# Patient Record
Sex: FEMALE | Race: WHITE | Employment: FULL TIME | URBAN - METROPOLITAN AREA
[De-identification: names, ages, dates, MRNs, and addresses within clinical notes are randomized per-mention and may not be internally consistent; named-entity substitution may affect disease eponyms.]

---

## 2018-01-07 ENCOUNTER — APPOINTMENT (OUTPATIENT)
Dept: CT IMAGING | Age: 63
End: 2018-01-07
Attending: EMERGENCY MEDICINE
Payer: SELF-PAY

## 2018-01-07 ENCOUNTER — HOSPITAL ENCOUNTER (EMERGENCY)
Age: 63
Discharge: HOME | End: 2018-01-07
Attending: EMERGENCY MEDICINE
Payer: SELF-PAY

## 2018-01-07 VITALS
HEART RATE: 59 BPM | WEIGHT: 100 LBS | DIASTOLIC BLOOD PRESSURE: 59 MMHG | HEIGHT: 61 IN | SYSTOLIC BLOOD PRESSURE: 91 MMHG | OXYGEN SATURATION: 94 % | BODY MASS INDEX: 18.88 KG/M2 | RESPIRATION RATE: 15 BRPM | TEMPERATURE: 98 F

## 2018-01-07 DIAGNOSIS — R51.9 NONINTRACTABLE EPISODIC HEADACHE, UNSPECIFIED HEADACHE TYPE: Primary | ICD-10-CM

## 2018-01-07 LAB
ALBUMIN SERPL-MCNC: 3.3 G/DL (ref 3.4–5)
ALBUMIN/GLOB SERPL: 0.9 {RATIO} (ref 0.8–1.7)
ALP SERPL-CCNC: 74 U/L (ref 45–117)
ALT SERPL-CCNC: 16 U/L (ref 13–56)
ANION GAP SERPL CALC-SCNC: 7 MMOL/L (ref 3–18)
AST SERPL-CCNC: 16 U/L (ref 15–37)
BASOPHILS # BLD: 0 K/UL (ref 0–0.06)
BASOPHILS NFR BLD: 0 % (ref 0–2)
BILIRUB SERPL-MCNC: 0.3 MG/DL (ref 0.2–1)
BUN SERPL-MCNC: 12 MG/DL (ref 7–18)
BUN/CREAT SERPL: 14 (ref 12–20)
CALCIUM SERPL-MCNC: 8.9 MG/DL (ref 8.5–10.1)
CHLORIDE SERPL-SCNC: 105 MMOL/L (ref 100–108)
CK MB CFR SERPL CALC: NORMAL % (ref 0–4)
CK MB SERPL-MCNC: <1 NG/ML (ref 5–25)
CK SERPL-CCNC: 61 U/L (ref 26–192)
CO2 SERPL-SCNC: 29 MMOL/L (ref 21–32)
CREAT SERPL-MCNC: 0.83 MG/DL (ref 0.6–1.3)
DIFFERENTIAL METHOD BLD: ABNORMAL
EOSINOPHIL # BLD: 0.2 K/UL (ref 0–0.4)
EOSINOPHIL NFR BLD: 2 % (ref 0–5)
ERYTHROCYTE [DISTWIDTH] IN BLOOD BY AUTOMATED COUNT: 14.3 % (ref 11.6–14.5)
ERYTHROCYTE [SEDIMENTATION RATE] IN BLOOD: 14 MM/HR (ref 0–30)
GLOBULIN SER CALC-MCNC: 3.5 G/DL (ref 2–4)
GLUCOSE SERPL-MCNC: 86 MG/DL (ref 74–99)
HCT VFR BLD AUTO: 43.7 % (ref 35–45)
HGB BLD-MCNC: 15.2 G/DL (ref 12–16)
LYMPHOCYTES # BLD: 1.6 K/UL (ref 0.9–3.6)
LYMPHOCYTES NFR BLD: 23 % (ref 21–52)
MCH RBC QN AUTO: 31.1 PG (ref 24–34)
MCHC RBC AUTO-ENTMCNC: 34.8 G/DL (ref 31–37)
MCV RBC AUTO: 89.4 FL (ref 74–97)
MONOCYTES # BLD: 0.7 K/UL (ref 0.05–1.2)
MONOCYTES NFR BLD: 9 % (ref 3–10)
NEUTS SEG # BLD: 4.6 K/UL (ref 1.8–8)
NEUTS SEG NFR BLD: 66 % (ref 40–73)
PLATELET # BLD AUTO: 222 K/UL (ref 135–420)
PMV BLD AUTO: 8.7 FL (ref 9.2–11.8)
POTASSIUM SERPL-SCNC: 4.7 MMOL/L (ref 3.5–5.5)
PROT SERPL-MCNC: 6.8 G/DL (ref 6.4–8.2)
RBC # BLD AUTO: 4.89 M/UL (ref 4.2–5.3)
SODIUM SERPL-SCNC: 141 MMOL/L (ref 136–145)
TROPONIN I SERPL-MCNC: <0.02 NG/ML (ref 0–0.06)
WBC # BLD AUTO: 7.1 K/UL (ref 4.6–13.2)

## 2018-01-07 PROCEDURE — 70450 CT HEAD/BRAIN W/O DYE: CPT

## 2018-01-07 PROCEDURE — 85652 RBC SED RATE AUTOMATED: CPT | Performed by: EMERGENCY MEDICINE

## 2018-01-07 PROCEDURE — 80053 COMPREHEN METABOLIC PANEL: CPT | Performed by: EMERGENCY MEDICINE

## 2018-01-07 PROCEDURE — 96375 TX/PRO/DX INJ NEW DRUG ADDON: CPT

## 2018-01-07 PROCEDURE — 96365 THER/PROPH/DIAG IV INF INIT: CPT

## 2018-01-07 PROCEDURE — 74011000258 HC RX REV CODE- 258: Performed by: EMERGENCY MEDICINE

## 2018-01-07 PROCEDURE — 82550 ASSAY OF CK (CPK): CPT | Performed by: EMERGENCY MEDICINE

## 2018-01-07 PROCEDURE — 85025 COMPLETE CBC W/AUTO DIFF WBC: CPT | Performed by: EMERGENCY MEDICINE

## 2018-01-07 PROCEDURE — 36415 COLL VENOUS BLD VENIPUNCTURE: CPT | Performed by: EMERGENCY MEDICINE

## 2018-01-07 PROCEDURE — 74011250636 HC RX REV CODE- 250/636: Performed by: EMERGENCY MEDICINE

## 2018-01-07 PROCEDURE — 96374 THER/PROPH/DIAG INJ IV PUSH: CPT

## 2018-01-07 PROCEDURE — 99283 EMERGENCY DEPT VISIT LOW MDM: CPT

## 2018-01-07 RX ORDER — DIPHENHYDRAMINE HYDROCHLORIDE 50 MG/ML
25 INJECTION, SOLUTION INTRAMUSCULAR; INTRAVENOUS ONCE
Status: COMPLETED | OUTPATIENT
Start: 2018-01-07 | End: 2018-01-07

## 2018-01-07 RX ORDER — ONDANSETRON 2 MG/ML
4 INJECTION INTRAMUSCULAR; INTRAVENOUS
Status: COMPLETED | OUTPATIENT
Start: 2018-01-07 | End: 2018-01-07

## 2018-01-07 RX ORDER — BUTALBITAL, ACETAMINOPHEN AND CAFFEINE 300; 40; 50 MG/1; MG/1; MG/1
1 CAPSULE ORAL
Qty: 12 CAP | Refills: 0 | Status: SHIPPED | OUTPATIENT
Start: 2018-01-07 | End: 2019-05-16

## 2018-01-07 RX ORDER — KETOROLAC TROMETHAMINE 30 MG/ML
30 INJECTION, SOLUTION INTRAMUSCULAR; INTRAVENOUS
Status: COMPLETED | OUTPATIENT
Start: 2018-01-07 | End: 2018-01-07

## 2018-01-07 RX ADMIN — PROMETHAZINE HYDROCHLORIDE 12.5 MG: 25 INJECTION, SOLUTION INTRAMUSCULAR; INTRAVENOUS at 11:05

## 2018-01-07 RX ADMIN — DIPHENHYDRAMINE HYDROCHLORIDE 25 MG: 50 INJECTION, SOLUTION INTRAMUSCULAR; INTRAVENOUS at 09:04

## 2018-01-07 RX ADMIN — KETOROLAC TROMETHAMINE 30 MG: 30 INJECTION, SOLUTION INTRAMUSCULAR at 09:04

## 2018-01-07 RX ADMIN — ONDANSETRON 4 MG: 2 INJECTION INTRAMUSCULAR; INTRAVENOUS at 09:04

## 2018-01-07 NOTE — ED TRIAGE NOTES
Pt states she has had a migraine headache for 4 days;  Tried aspirin and tylenol without relief;  Makes her nauseated at times also;

## 2018-01-07 NOTE — DISCHARGE INSTRUCTIONS

## 2018-01-07 NOTE — ED PROVIDER NOTES
EMERGENCY DEPARTMENT HISTORY AND PHYSICAL EXAM    Date: 1/7/2018  Patient Name: Tasha Moon    History of Presenting Illness     Chief Complaint   Patient presents with    Headache         History Provided By: Patient    Chief Complaint: HA  Duration: 4 Days  Timing:  Acute  Location: Frontal head  Severity: 10 out of 10  Modifying Factors: HA is exacerbated when coughing. Associated Symptoms: visual disturbance, nausea, vomiting, and phonophobia    Additional History (Context):   8:21 AM  Tasha Moon is a 65 y.o. female with PMHx of arthritis, chronic pain, fibromyalgia, and COPD who presents to the emergency department C/O frontal HA (10/10) radiating to her posterior head onset 4 days ago. Associated sxs include visual disturbance, nausea, vomiting, and phonophobia. Pt denies having similar sxs previously. HA is exacerbated when coughing. Denies HA radiating down her neck. Endorses tobacco use. NKDA. Pt denies sinus pain, tinnitus, Hx diabetes or HTN, and any other sxs or complaints. PCP: Robert Bliss MD        Past History     Past Medical History:  Past Medical History:   Diagnosis Date    Arthritis     Chronic obstructive pulmonary disease ([address]     Chronic pain     Fibromyalgia     Ill-defined condition     raynaulds       Past Surgical History:  Past Surgical History:   Procedure Laterality Date    HX ORTHOPAEDIC      back    NEUROLOGICAL PROCEDURE UNLISTED      brain surgery       Family History:  History reviewed. No pertinent family history. Social History:  Social History   Substance Use Topics    Smoking status: Current Every Day Smoker     Packs/day: 0.50    Smokeless tobacco: None    Alcohol use No       Allergies:  No Known Allergies      Review of Systems   Review of Systems   HENT: Negative for sinus pain and tinnitus.         (+) Phonophobia   Eyes: Positive for visual disturbance. Gastrointestinal: Positive for nausea and vomiting.    Neurological: Positive for headaches. All other systems reviewed and are negative. Physical Exam     Vitals:    01/07/18 0745 01/07/18 1102   BP: 104/66 108/68   Pulse: 76 74   Resp: 16 18   Temp: 98 °F (36.7 °C)    SpO2: 100%    Weight: 45.4 kg (100 lb)    Height: 5' 1\" (1.549 m)      Physical Exam   Constitutional: She is oriented to person, place, and time. She appears well-developed and well-nourished. No distress. Slender female   HENT:   Head: Normocephalic and atraumatic. Eyes: Pupils are equal, round, and reactive to light. Neck: Neck supple. Cardiovascular: Normal rate, regular rhythm, S1 normal, S2 normal and normal heart sounds. Pulmonary/Chest: Breath sounds normal. No respiratory distress. She has no wheezes. She has no rales. She exhibits no tenderness. Abdominal: Soft. She exhibits no distension and no mass. There is no tenderness. There is no guarding. Musculoskeletal: Normal range of motion. She exhibits no edema or tenderness. Neurological: She is alert and oriented to person, place, and time. No cranial nerve deficit. Skin: No rash noted. Psychiatric: She has a normal mood and affect. Her behavior is normal. Thought content normal.   Nursing note and vitals reviewed. Diagnostic Study Results     Labs -     Recent Results (from the past 12 hour(s))   CBC WITH AUTOMATED DIFF    Collection Time: 01/07/18  8:40 AM   Result Value Ref Range    WBC 7.1 4.6 - 13.2 K/uL    RBC 4.89 4.20 - 5.30 M/uL    HGB 15.2 12.0 - 16.0 g/dL    HCT 43.7 35.0 - 45.0 %    MCV 89.4 74.0 - 97.0 FL    MCH 31.1 24.0 - 34.0 PG    MCHC 34.8 31.0 - 37.0 g/dL    RDW 14.3 11.6 - 14.5 %    PLATELET 218 643 - 623 K/uL    MPV 8.7 (L) 9.2 - 11.8 FL    NEUTROPHILS 66 40 - 73 %    LYMPHOCYTES 23 21 - 52 %    MONOCYTES 9 3 - 10 %    EOSINOPHILS 2 0 - 5 %    BASOPHILS 0 0 - 2 %    ABS. NEUTROPHILS 4.6 1.8 - 8.0 K/UL    ABS. LYMPHOCYTES 1.6 0.9 - 3.6 K/UL    ABS. MONOCYTES 0.7 0.05 - 1.2 K/UL    ABS.  EOSINOPHILS 0.2 0.0 - 0.4 K/UL ABS. BASOPHILS 0.0 0.0 - 0.06 K/UL    DF AUTOMATED     SED RATE (ESR)    Collection Time: 01/07/18  8:40 AM   Result Value Ref Range    Sed rate, automated 14 0 - 30 mm/hr   CARDIAC PANEL,(CK, CKMB & TROPONIN)    Collection Time: 01/07/18 10:13 AM   Result Value Ref Range    CK 61 26 - 192 U/L    CK - MB <1.0 <3.6 ng/ml    CK-MB Index  0.0 - 4.0 %     CALCULATION NOT PERFORMED WHEN RESULT IS BELOW LINEAR LIMIT    Troponin-I, Qt. <0.02 0.00 - 8.54 NG/ML   METABOLIC PANEL, COMPREHENSIVE    Collection Time: 01/07/18 10:13 AM   Result Value Ref Range    Sodium 141 136 - 145 mmol/L    Potassium 4.7 3.5 - 5.5 mmol/L    Chloride 105 100 - 108 mmol/L    CO2 29 21 - 32 mmol/L    Anion gap 7 3.0 - 18 mmol/L    Glucose 86 74 - 99 mg/dL    BUN 12 7.0 - 18 MG/DL    Creatinine 0.83 0.6 - 1.3 MG/DL    BUN/Creatinine ratio 14 12 - 20      GFR est AA >60 >60 ml/min/1.73m2    GFR est non-AA >60 >60 ml/min/1.73m2    Calcium 8.9 8.5 - 10.1 MG/DL    Bilirubin, total 0.3 0.2 - 1.0 MG/DL    ALT (SGPT) 16 13 - 56 U/L    AST (SGOT) 16 15 - 37 U/L    Alk. phosphatase 74 45 - 117 U/L    Protein, total 6.8 6.4 - 8.2 g/dL    Albumin 3.3 (L) 3.4 - 5.0 g/dL    Globulin 3.5 2.0 - 4.0 g/dL    A-G Ratio 0.9 0.8 - 1.7         Radiologic Studies -   CT HEAD WO CONT   Final Result   IMPRESSION:     1. No acute intracranial abnormalities are identified. No CT evidence to  suggest acute intracranial hematoma, cortical infarct, or mass effect/mass  lesion. 2. Surgical changes of previous foramen magnum compression, suspect related to  Chiari malformation; borderline low-lying cerebellar tonsils on sagittal images. As read by the radiologist.     CT Results  (Last 48 hours)               01/07/18 0927  CT HEAD WO CONT Final result    Impression:  IMPRESSION:       1. No acute intracranial abnormalities are identified. No CT evidence to   suggest acute intracranial hematoma, cortical infarct, or mass effect/mass   lesion.    2. Surgical changes of previous foramen magnum compression, suspect related to   Chiari malformation; borderline low-lying cerebellar tonsils on sagittal images. Narrative:  EXAM: CT head       INDICATION: Persistent severe migraine for the past 4 days. COMPARISON: None. TECHNIQUE: Axial CT imaging of the head  was obtained from skull base to vertex   without intravenous contrast.       One or more dose reduction techniques were used on this CT: automated exposure   control, adjustment of the mAs and/or kVp according to patient's size, and   iterative reconstruction techniques. The specific techniques utilized on this CT   exam have been documented in the patient's electronic medical record.       _______________       FINDINGS:       BRAIN AND POSTERIOR FOSSA: The sulci, folia, ventricles and basal cisterns are   within normal limits for the patient?s age. Slightly low-lying cerebellar   tonsils. There is no intracranial hemorrhage, mass effect, or shift of midline   structures. There are no areas of abnormal parenchymal attenuation. EXTRA-AXIAL SPACES AND MENINGES: There are no abnormal extra-axial fluid   collections. CALVARIUM: Surgical changes of previous foramen magnum decompression   craniectomy. No acute osseous abnormality. SINUSES: Mild mucosal thickening scattered ethmoid air cells, otherwise   paranasal sinuses and mastoid air cells are well aerated.        OTHER: None.       _______________               CXR Results  (Last 48 hours)    None          Medications given in the ED-  Medications   ondansetron (ZOFRAN) injection 4 mg (4 mg IntraVENous Given 1/7/18 0904)   ketorolac (TORADOL) injection 30 mg (30 mg IntraVENous Given 1/7/18 0904)   diphenhydrAMINE (BENADRYL) injection 25 mg (25 mg IntraVENous Given 1/7/18 0904)   promethazine (PHENERGAN) 12.5 mg in 0.9% sodium chloride 50 mL IVPB (12.5 mg IntraVENous New Bag 1/7/18 1105)         Medical Decision Making   I am the first provider for this patient. I reviewed the vital signs, available nursing notes, past medical history, past surgical history, family history and social history. Vital Signs-Reviewed the patient's vital signs. Pulse Oximetry Analysis - 100% on RA. Records Reviewed: Nursing Notes    Provider Notes (Medical Decision Making):   INITIAL CLINICAL IMPRESSION and PLANS:  The patient presents with the primary complaint(s) of: HA. The presentation, to include historical aspects and clinical findings are consistent with the DX of HA. However, other possible DX's to consider as primary, associated with, or exacerbated by include:    1. Migraine HA  2. Intracranial bleed  3. Tumor  4. Sinusitis  5. Cluster HA  6. Tension HA  7. CVA    Considering the above, my initial management plan to evaluate and therapeutic interventions include the following and as noted in the orders:    1. Labs: Cardiac panel, SED Rate (ESR), CMP, CBC  2. Imaging: CT Head w/o contrast    Recorded by Em Thompson ED Scribe, as dictated by Rg Fowler MD.    Procedures:  Procedures    ED Course:   8:21 AM Initial assessment performed. The patients presenting problems have been discussed, and they are in agreement with the care plan formulated and outlined with them. I have encouraged them to ask questions as they arise throughout their visit. PROGRESS NOTE:   11:01 AM  Pt has been re-examined by Rg Fowler MD. Pt states she feels much better. 20 minutes ago she stated the HA was not improved. Discussed spinal tap and CTA. She did not want the spinal tap. She now states her HA is a 3/10. Will hold off on the CTA and spinal tap. Diagnosis and Disposition       DISCHARGE NOTE:  11:43 AM  Александр Marzenaalexsander Alejandra's  results have been reviewed with her. She has been counseled regarding her diagnosis, treatment, and plan.   She verbally conveys understanding and agreement of the signs, symptoms, diagnosis, treatment and prognosis and additionally agrees to follow up as discussed. She also agrees with the care-plan and conveys that all of her questions have been answered. I have also provided discharge instructions for her that include: educational information regarding their diagnosis and treatment, and list of reasons why they would want to return to the ED prior to their follow-up appointment, should her condition change. She has been provided with education for proper emergency department utilization. CLINICAL IMPRESSION:    1. Nonintractable episodic headache, unspecified headache type        PLAN:  1. D/C Home  2. Current Discharge Medication List      START taking these medications    Details   butalbital-acetaminophen-caff (FIORICET) -40 mg per capsule Take 1 Cap by mouth every four (4) hours as needed for Pain. Qty: 12 Cap, Refills: 0           3. Follow-up Information     Follow up With Details Comments Contact Info    Tonye Buerger, MD Schedule an appointment as soon as possible for a visit in 3 days For primary care follow up. [address]  [phone]      [facility] EMERGENCY DEPT Go to As needed, If symptoms worsen [address] [id number]  [phone]        _______________________________    Attestations: This note is prepared by Ellie Meza, acting as Scribe for Wilian Ruiz MD.    Wilian Ruiz MD:  The scribe's documentation has been prepared under my direction and personally reviewed by me in its entirety.   I confirm that the note above accurately reflects all work, treatment, procedures, and medical decision making performed by me.  _______________________________

## 2018-01-07 NOTE — ED NOTES
Assumed care of patient at this time. Pt lying in stretcher, eyes closed in NAD, pt medicated per MAR orders, verified w/ patient that her daughter is OTW to pick her up from ER prior to administration of medication. Pt repositioned in stretcher for comfort. Bed in lowest locked position, call bell within reach (left side rail), side rail raised x 1 (left). Pt expresses no further needs at this time.

## 2018-01-08 ENCOUNTER — HOSPITAL ENCOUNTER (EMERGENCY)
Age: 63
Discharge: HOME | End: 2018-01-08
Attending: EMERGENCY MEDICINE | Admitting: EMERGENCY MEDICINE
Payer: SELF-PAY

## 2018-01-08 VITALS
SYSTOLIC BLOOD PRESSURE: 94 MMHG | HEIGHT: 61 IN | OXYGEN SATURATION: 96 % | BODY MASS INDEX: 18.88 KG/M2 | DIASTOLIC BLOOD PRESSURE: 55 MMHG | TEMPERATURE: 98.1 F | WEIGHT: 100 LBS | RESPIRATION RATE: 18 BRPM | HEART RATE: 60 BPM

## 2018-01-08 DIAGNOSIS — R51.9 NONINTRACTABLE HEADACHE, UNSPECIFIED CHRONICITY PATTERN, UNSPECIFIED HEADACHE TYPE: Primary | ICD-10-CM

## 2018-01-08 LAB
ALBUMIN SERPL-MCNC: 3.4 G/DL (ref 3.4–5)
ALBUMIN/GLOB SERPL: 0.9 {RATIO} (ref 0.8–1.7)
ALP SERPL-CCNC: 79 U/L (ref 45–117)
ALT SERPL-CCNC: 19 U/L (ref 13–56)
ANION GAP SERPL CALC-SCNC: 12 MMOL/L (ref 3–18)
APPEARANCE UR: CLEAR
AST SERPL-CCNC: 18 U/L (ref 15–37)
BACTERIA URNS QL MICRO: ABNORMAL /HPF
BASOPHILS # BLD: 0 K/UL (ref 0–0.06)
BASOPHILS NFR BLD: 1 % (ref 0–2)
BILIRUB SERPL-MCNC: 0.3 MG/DL (ref 0.2–1)
BILIRUB UR QL: NEGATIVE
BUN SERPL-MCNC: 13 MG/DL (ref 7–18)
BUN/CREAT SERPL: 18 (ref 12–20)
CALCIUM SERPL-MCNC: 8.8 MG/DL (ref 8.5–10.1)
CHLORIDE SERPL-SCNC: 104 MMOL/L (ref 100–108)
CO2 SERPL-SCNC: 25 MMOL/L (ref 21–32)
COLOR UR: YELLOW
CREAT SERPL-MCNC: 0.73 MG/DL (ref 0.6–1.3)
DIFFERENTIAL METHOD BLD: ABNORMAL
EOSINOPHIL # BLD: 0 K/UL (ref 0–0.4)
EOSINOPHIL NFR BLD: 0 % (ref 0–5)
EPITH CASTS URNS QL MICRO: ABNORMAL /LPF (ref 0–5)
ERYTHROCYTE [DISTWIDTH] IN BLOOD BY AUTOMATED COUNT: 14 % (ref 11.6–14.5)
GLOBULIN SER CALC-MCNC: 3.6 G/DL (ref 2–4)
GLUCOSE SERPL-MCNC: 127 MG/DL (ref 74–99)
GLUCOSE UR STRIP.AUTO-MCNC: NEGATIVE MG/DL
HCT VFR BLD AUTO: 39.8 % (ref 35–45)
HGB BLD-MCNC: 13.7 G/DL (ref 12–16)
HGB UR QL STRIP: ABNORMAL
KETONES UR QL STRIP.AUTO: NEGATIVE MG/DL
LEUKOCYTE ESTERASE UR QL STRIP.AUTO: NEGATIVE
LYMPHOCYTES # BLD: 1.3 K/UL (ref 0.9–3.6)
LYMPHOCYTES NFR BLD: 20 % (ref 21–52)
MCH RBC QN AUTO: 31 PG (ref 24–34)
MCHC RBC AUTO-ENTMCNC: 34.4 G/DL (ref 31–37)
MCV RBC AUTO: 90 FL (ref 74–97)
MONOCYTES # BLD: 0.6 K/UL (ref 0.05–1.2)
MONOCYTES NFR BLD: 10 % (ref 3–10)
MUCOUS THREADS URNS QL MICRO: ABNORMAL /LPF
NEUTS SEG # BLD: 4.6 K/UL (ref 1.8–8)
NEUTS SEG NFR BLD: 69 % (ref 40–73)
NITRITE UR QL STRIP.AUTO: NEGATIVE
PH UR STRIP: 5 [PH] (ref 5–8)
PLATELET # BLD AUTO: 221 K/UL (ref 135–420)
PMV BLD AUTO: 8.6 FL (ref 9.2–11.8)
POTASSIUM SERPL-SCNC: 4.1 MMOL/L (ref 3.5–5.5)
PROT SERPL-MCNC: 7 G/DL (ref 6.4–8.2)
PROT UR STRIP-MCNC: NEGATIVE MG/DL
RBC # BLD AUTO: 4.42 M/UL (ref 4.2–5.3)
RBC #/AREA URNS HPF: ABNORMAL /HPF (ref 0–5)
SODIUM SERPL-SCNC: 141 MMOL/L (ref 136–145)
SP GR UR REFRACTOMETRY: 1.02 (ref 1–1.03)
UROBILINOGEN UR QL STRIP.AUTO: 0.2 EU/DL (ref 0.2–1)
WBC # BLD AUTO: 6.6 K/UL (ref 4.6–13.2)
WBC URNS QL MICRO: ABNORMAL /HPF (ref 0–5)

## 2018-01-08 PROCEDURE — 96374 THER/PROPH/DIAG INJ IV PUSH: CPT

## 2018-01-08 PROCEDURE — 96375 TX/PRO/DX INJ NEW DRUG ADDON: CPT

## 2018-01-08 PROCEDURE — 99283 EMERGENCY DEPT VISIT LOW MDM: CPT

## 2018-01-08 PROCEDURE — 81001 URINALYSIS AUTO W/SCOPE: CPT | Performed by: PHYSICIAN ASSISTANT

## 2018-01-08 PROCEDURE — 96361 HYDRATE IV INFUSION ADD-ON: CPT

## 2018-01-08 PROCEDURE — 74011250636 HC RX REV CODE- 250/636: Performed by: PHYSICIAN ASSISTANT

## 2018-01-08 PROCEDURE — 85025 COMPLETE CBC W/AUTO DIFF WBC: CPT | Performed by: PHYSICIAN ASSISTANT

## 2018-01-08 PROCEDURE — 80053 COMPREHEN METABOLIC PANEL: CPT | Performed by: PHYSICIAN ASSISTANT

## 2018-01-08 RX ORDER — SODIUM CHLORIDE 0.9 % (FLUSH) 0.9 %
5-10 SYRINGE (ML) INJECTION AS NEEDED
Status: DISCONTINUED | OUTPATIENT
Start: 2018-01-08 | End: 2018-01-08 | Stop reason: HOSPADM

## 2018-01-08 RX ORDER — MORPHINE SULFATE 2 MG/ML
4 INJECTION, SOLUTION INTRAMUSCULAR; INTRAVENOUS ONCE
Status: COMPLETED | OUTPATIENT
Start: 2018-01-08 | End: 2018-01-08

## 2018-01-08 RX ORDER — METHOCARBAMOL 750 MG/1
750 TABLET, FILM COATED ORAL 3 TIMES DAILY
Qty: 20 TAB | Refills: 0 | Status: SHIPPED | OUTPATIENT
Start: 2018-01-08 | End: 2019-05-16

## 2018-01-08 RX ORDER — ONDANSETRON 2 MG/ML
4 INJECTION INTRAMUSCULAR; INTRAVENOUS ONCE
Status: COMPLETED | OUTPATIENT
Start: 2018-01-08 | End: 2018-01-08

## 2018-01-08 RX ORDER — PREDNISONE 10 MG/1
TABLET ORAL
Qty: 21 TAB | Refills: 0 | Status: SHIPPED | OUTPATIENT
Start: 2018-01-08 | End: 2019-05-16

## 2018-01-08 RX ORDER — DEXAMETHASONE SODIUM PHOSPHATE 10 MG/ML
10 INJECTION INTRAMUSCULAR; INTRAVENOUS
Status: COMPLETED | OUTPATIENT
Start: 2018-01-08 | End: 2018-01-08

## 2018-01-08 RX ADMIN — ONDANSETRON 4 MG: 2 INJECTION INTRAMUSCULAR; INTRAVENOUS at 10:43

## 2018-01-08 RX ADMIN — DEXAMETHASONE SODIUM PHOSPHATE 10 MG: 10 INJECTION, SOLUTION INTRAMUSCULAR; INTRAVENOUS at 10:44

## 2018-01-08 RX ADMIN — SODIUM CHLORIDE 1000 ML: 900 INJECTION, SOLUTION INTRAVENOUS at 10:41

## 2018-01-08 RX ADMIN — MORPHINE SULFATE 4 MG: 2 INJECTION, SOLUTION INTRAMUSCULAR; INTRAVENOUS at 10:45

## 2018-01-08 NOTE — ED TRIAGE NOTES
Pt states seen here yest for same, not any better, continues to have headache. Pt states had CT scan done yest. Negative results, pt states hx of migraines pt states this headache is different. Sepsis Screening completed    (  )Patient meets SIRS criteria. (x  )Patient does not meet SIRS criteria.       SIRS Criteria is achieved when two or more of the following are present   Temperature < 96.8°F (36°C) or > 100.9°F (38.3°C)   Heart Rate > 90 beats per minute   Respiratory Rate > 20 breaths per minute   WBC count > 12,000 or <4,000 or > 10% bands

## 2018-01-08 NOTE — ED PROVIDER NOTES
EMERGENCY DEPARTMENT HISTORY AND PHYSICAL EXAM    Date: 1/8/2018  Patient Name: Radha Hubbard    History of Presenting Illness     Chief Complaint   Patient presents with    Headache         History Provided By: Patient    Chief Complaint: headache  Duration: 5 Days  Timing:  Gradual  Location: frontal, radiating to right side of her head  Quality: throbbing  Severity: 10 out of 10  Associated Symptoms: dizziness, blurry vision, confusion    Additional History (Context):   10:05 AM    Radha Hubbard is a 65 y.o. female with pertinent PMHx of chiari malformation repair surgery, chronic pain, fibromyalgia and migraines presenting ambulatory to the ED c/o 10/10 throbbing headache x 5 days. Pt states that the headache started frontally, which began radiating to the right side of her head yesterday. Pt notes associated sxs of blurry vision, dizziness, and confusion. Pt denies any hx of similar sxs. Pt states that she was taken Tylenol and Darius ASA, with no relief. Pt was seen in the ED yesterday and was given Fioricet, which did not seem to help today. Pt had a nml CT yesterday. Pt notes that she had a right upper tooth pulled recently. Pt specifically denies any urinary sxs, cough, congestion, abd pain, diarrhea, fever/chills, CP, new neck stiffness, or new SOB. PCP: Mirtha Swann MD  Social Hx: + tobacco use, - alcohol use, - illicit drug use    There are no other complaints, changes, or physical findings at this time. Current Facility-Administered Medications   Medication Dose Route Frequency Provider Last Rate Last Dose    sodium chloride (NS) flush 5-10 mL  5-10 mL IntraVENous PRN CARISSA Banda         Current Outpatient Prescriptions   Medication Sig Dispense Refill    predniSONE (STERAPRED DS) 10 mg dose pack 6,5,4,3,2,1 taper 21 Tab 0    methocarbamol (ROBAXIN-750) 750 mg tablet Take 1 Tab by mouth three (3) times daily.  20 Tab 0    butalbital-acetaminophen-caff (FIORICET) -40 mg per capsule Take 1 Cap by mouth every four (4) hours as needed for Pain. 12 Cap 0       Past History     Past Medical History:  Past Medical History:   Diagnosis Date    Arthritis     Chronic obstructive pulmonary disease (HCC)     Chronic pain     Fibromyalgia     Ill-defined condition     geneva       Past Surgical History:  Past Surgical History:   Procedure Laterality Date    HX ORTHOPAEDIC      back    NEUROLOGICAL PROCEDURE UNLISTED      brain surgery       Family History:  History reviewed. No pertinent family history. Social History:  Social History   Substance Use Topics    Smoking status: Current Every Day Smoker     Packs/day: 0.50    Smokeless tobacco: Never Used    Alcohol use No       Allergies:  No Known Allergies      Review of Systems   Review of Systems   Constitutional: Negative for chills and fever. HENT: Negative for congestion. Eyes: Positive for visual disturbance (blurry vision). Respiratory: Negative for cough and shortness of breath. Cardiovascular: Negative for chest pain. Gastrointestinal: Negative for abdominal pain and diarrhea. Genitourinary: Negative. Negative for dysuria and frequency. Musculoskeletal: Negative for neck pain and neck stiffness. Neurological: Positive for dizziness and headaches. Psychiatric/Behavioral: Positive for confusion. All other systems reviewed and are negative. Physical Exam     Vitals:    01/08/18 1001   BP: 94/55   Pulse: 60   Resp: 18   Temp: 98.1 °F (36.7 °C)   SpO2: 96%   Weight: 45.4 kg (100 lb)   Height: 5' 1\" (1.549 m)     Physical Exam   Constitutional: She is oriented to person, place, and time. She appears well-developed and well-nourished. No distress. HENT:   Head: Normocephalic and atraumatic.    Right Ear: Tympanic membrane, external ear and ear canal normal.   Left Ear: Tympanic membrane, external ear and ear canal normal.   Nose: Nose normal.   Mouth/Throat: Uvula is midline, oropharynx is clear and moist and mucous membranes are normal. No oropharyngeal exudate, posterior oropharyngeal edema or posterior oropharyngeal erythema. Eyes: EOM are normal. Pupils are equal, round, and reactive to light. Right eye exhibits no discharge. Left eye exhibits no discharge. Neck: Trachea normal, normal range of motion and full passive range of motion without pain. Neck supple. No rigidity. Cardiovascular: Normal rate, regular rhythm, normal heart sounds and normal pulses. Exam reveals no gallop and no friction rub. No murmur heard. Pulmonary/Chest: Effort normal and breath sounds normal. No respiratory distress. She has no wheezes. She has no rales. She exhibits no tenderness. Abdominal: Soft. Bowel sounds are normal. She exhibits no distension and no mass. There is no tenderness. There is no rebound and no guarding. Musculoskeletal: Normal range of motion. Lymphadenopathy:     She has no cervical adenopathy. Neurological: She is alert and oriented to person, place, and time. She has normal reflexes. She displays normal reflexes. No cranial nerve deficit or sensory deficit. She exhibits normal muscle tone. She displays a negative Romberg sign. Coordination normal.   Skin: Skin is warm and dry. No rash noted. She is not diaphoretic. Psychiatric: She has a normal mood and affect. Judgment normal.   Nursing note and vitals reviewed. DDX: probable tension HA. No evidence of dental abscess after recent extraction. Had normal CT yesterday.   Will check basic labs, Hydrate and treat pain    Diagnostic Study Results     Labs -     Recent Results (from the past 12 hour(s))   CBC WITH AUTOMATED DIFF    Collection Time: 01/08/18 10:27 AM   Result Value Ref Range    WBC 6.6 4.6 - 13.2 K/uL    RBC 4.42 4.20 - 5.30 M/uL    HGB 13.7 12.0 - 16.0 g/dL    HCT 39.8 35.0 - 45.0 %    MCV 90.0 74.0 - 97.0 FL    MCH 31.0 24.0 - 34.0 PG    MCHC 34.4 31.0 - 37.0 g/dL    RDW 14.0 11.6 - 14.5 %    PLATELET 547 554 - 420 K/uL    MPV 8.6 (L) 9.2 - 11.8 FL    NEUTROPHILS 69 40 - 73 %    LYMPHOCYTES 20 (L) 21 - 52 %    MONOCYTES 10 3 - 10 %    EOSINOPHILS 0 0 - 5 %    BASOPHILS 1 0 - 2 %    ABS. NEUTROPHILS 4.6 1.8 - 8.0 K/UL    ABS. LYMPHOCYTES 1.3 0.9 - 3.6 K/UL    ABS. MONOCYTES 0.6 0.05 - 1.2 K/UL    ABS. EOSINOPHILS 0.0 0.0 - 0.4 K/UL    ABS. BASOPHILS 0.0 0.0 - 0.06 K/UL    DF AUTOMATED     METABOLIC PANEL, COMPREHENSIVE    Collection Time: 01/08/18 10:27 AM   Result Value Ref Range    Sodium 141 136 - 145 mmol/L    Potassium 4.1 3.5 - 5.5 mmol/L    Chloride 104 100 - 108 mmol/L    CO2 25 21 - 32 mmol/L    Anion gap 12 3.0 - 18 mmol/L    Glucose 127 (H) 74 - 99 mg/dL    BUN 13 7.0 - 18 MG/DL    Creatinine 0.73 0.6 - 1.3 MG/DL    BUN/Creatinine ratio 18 12 - 20      GFR est AA >60 >60 ml/min/1.73m2    GFR est non-AA >60 >60 ml/min/1.73m2    Calcium 8.8 8.5 - 10.1 MG/DL    Bilirubin, total 0.3 0.2 - 1.0 MG/DL    ALT (SGPT) 19 13 - 56 U/L    AST (SGOT) 18 15 - 37 U/L    Alk.  phosphatase 79 45 - 117 U/L    Protein, total 7.0 6.4 - 8.2 g/dL    Albumin 3.4 3.4 - 5.0 g/dL    Globulin 3.6 2.0 - 4.0 g/dL    A-G Ratio 0.9 0.8 - 1.7     URINALYSIS W/ RFLX MICROSCOPIC    Collection Time: 01/08/18 11:40 AM   Result Value Ref Range    Color YELLOW      Appearance CLEAR      Specific gravity 1.022 1.005 - 1.030      pH (UA) 5.0 5.0 - 8.0      Protein NEGATIVE  NEG mg/dL    Glucose NEGATIVE  NEG mg/dL    Ketone NEGATIVE  NEG mg/dL    Bilirubin NEGATIVE  NEG      Blood TRACE (A) NEG      Urobilinogen 0.2 0.2 - 1.0 EU/dL    Nitrites NEGATIVE  NEG      Leukocyte Esterase NEGATIVE  NEG     URINE MICROSCOPIC ONLY    Collection Time: 01/08/18 11:40 AM   Result Value Ref Range    WBC 0 to 1 0 - 5 /hpf    RBC 0 to 3 0 - 5 /hpf    Epithelial cells 2+ 0 - 5 /lpf    Bacteria FEW (A) NEG /hpf    Mucus FEW (A) NEG /lpf       Radiologic Studies -   No orders to display     CT Results  (Last 48 hours)               01/07/18 0927  CT HEAD WO CONT Final result    Impression:  IMPRESSION:       1. No acute intracranial abnormalities are identified. No CT evidence to   suggest acute intracranial hematoma, cortical infarct, or mass effect/mass   lesion. 2. Surgical changes of previous foramen magnum compression, suspect related to   Chiari malformation; borderline low-lying cerebellar tonsils on sagittal images. Narrative:  EXAM: CT head       INDICATION: Persistent severe migraine for the past 4 days. COMPARISON: None. TECHNIQUE: Axial CT imaging of the head  was obtained from skull base to vertex   without intravenous contrast.       One or more dose reduction techniques were used on this CT: automated exposure   control, adjustment of the mAs and/or kVp according to patient's size, and   iterative reconstruction techniques. The specific techniques utilized on this CT   exam have been documented in the patient's electronic medical record.       _______________       FINDINGS:       BRAIN AND POSTERIOR FOSSA: The sulci, folia, ventricles and basal cisterns are   within normal limits for the patient?s age. Slightly low-lying cerebellar   tonsils. There is no intracranial hemorrhage, mass effect, or shift of midline   structures. There are no areas of abnormal parenchymal attenuation. EXTRA-AXIAL SPACES AND MENINGES: There are no abnormal extra-axial fluid   collections. CALVARIUM: Surgical changes of previous foramen magnum decompression   craniectomy. No acute osseous abnormality. SINUSES: Mild mucosal thickening scattered ethmoid air cells, otherwise   paranasal sinuses and mastoid air cells are well aerated. OTHER: None.       _______________                   Medical Decision Making   I am the first provider for this patient. I reviewed the vital signs, available nursing notes, past medical history, past surgical history, family history and social history.     Vital Signs-Reviewed the patient's vital signs.    Pulse Oximetry Analysis - 96% on RA     Records Reviewed: Nursing Notes, Old Medical Records and Previous Radiology Studies    Provider Notes (Medical Decision Making): PROCEDURES:  Procedures    MEDICATIONS GIVEN IN THE ED:  Medications   sodium chloride (NS) flush 5-10 mL (not administered)   sodium chloride 0.9 % bolus infusion 1,000 mL (1,000 mL IntraVENous New Bag 1/8/18 1041)   ondansetron (ZOFRAN) injection 4 mg (4 mg IntraVENous Given 1/8/18 1043)   morphine injection 4 mg (4 mg IntraVENous Given 1/8/18 1045)   dexamethasone (PF) (DECADRON) injection 10 mg (10 mg IntraVENous Given 1/8/18 1044)        ED COURSE:   10:05 AM   Initial assessment performed. PROGRESS NOTE:  12:32 PM  Discussed pt's hx and available diagnostic and imaging results with Brook Westfall MD, the pt's attending. Reviewed care plans and Brook Westfall MD is in agreement with the plan of care and DC. Written by GASTON Riojasibpipo, as dictated by Sonia Rae PA-C. PROGRESS NOTE:  12:37 PM  Pt and/or family have been updated on their results. Pt and/or pt's family are aware of the plan of care and are in agreement. Written by GASTON Riojas, as dictated by Sonia Rae PA-C. Diagnosis and Disposition       DISCHARGE NOTE:  12:38 PM  The patient is ready for discharge. The patient's signs, symptoms, diagnosis, and discharge instructions have been discussed and the patient and/or family has conveyed their understanding. The patient and/or family is to follow up as recommended or return to the ER should their symptoms worsen. Plan has been discussed and the patient and/or family is in agreement. Written by GASTON Riojas, as dictated by Sonia Rae PA-C.     CLINICAL IMPRESSION:  1. Nonintractable headache, unspecified chronicity pattern, unspecified headache type        PLAN:  1. D/C Home  2.    Current Discharge Medication List      START taking these medications Details   predniSONE (STERAPRED DS) 10 mg dose pack 6,5,4,3,2,1 taper  Qty: 21 Tab, Refills: 0      methocarbamol (ROBAXIN-750) 750 mg tablet Take 1 Tab by mouth three (3) times daily. Qty: 20 Tab, Refills: 0           3. Follow-up Information     Follow up With Details Comments [address], [place] Schedule an appointment as soon as possible for a visit for primary care follow up, as needed [address] Tang Portillo MD Schedule an appointment as soon as possible for a visit for neurology follow up, as needed [address] [facility] [address]  [phone]      [facility] EMERGENCY DEPT  As needed, If symptoms worsen [address] [address]  [phone]        _______________________________    Attestations: This note is prepared by Otto De Dios, acting as Scribe for [employer]. [employer]:  The scribe's documentation has been prepared under my direction and personally reviewed by me in its entirety.   I confirm that the note above accurately reflects all work, treatment, procedures, and medical decision making performed by me.  _______________________________

## 2018-01-10 ENCOUNTER — HOSPITAL ENCOUNTER (EMERGENCY)
Age: 63
Discharge: HOME | End: 2018-01-10
Attending: EMERGENCY MEDICINE
Payer: SELF-PAY

## 2018-01-10 VITALS
DIASTOLIC BLOOD PRESSURE: 68 MMHG | RESPIRATION RATE: 16 BRPM | TEMPERATURE: 97.5 F | HEART RATE: 64 BPM | BODY MASS INDEX: 18.88 KG/M2 | SYSTOLIC BLOOD PRESSURE: 102 MMHG | WEIGHT: 100 LBS | HEIGHT: 61 IN | OXYGEN SATURATION: 99 %

## 2018-01-10 DIAGNOSIS — R51.9 NONINTRACTABLE HEADACHE, UNSPECIFIED CHRONICITY PATTERN, UNSPECIFIED HEADACHE TYPE: Primary | ICD-10-CM

## 2018-01-10 LAB
ANION GAP SERPL CALC-SCNC: 11 MMOL/L (ref 3–18)
BASOPHILS # BLD: 0 K/UL (ref 0–0.06)
BASOPHILS NFR BLD: 1 % (ref 0–2)
BUN SERPL-MCNC: 11 MG/DL (ref 7–18)
BUN/CREAT SERPL: 13 (ref 12–20)
CALCIUM SERPL-MCNC: 9.1 MG/DL (ref 8.5–10.1)
CHLORIDE SERPL-SCNC: 105 MMOL/L (ref 100–108)
CO2 SERPL-SCNC: 28 MMOL/L (ref 21–32)
CREAT SERPL-MCNC: 0.85 MG/DL (ref 0.6–1.3)
DIFFERENTIAL METHOD BLD: ABNORMAL
EOSINOPHIL # BLD: 0 K/UL (ref 0–0.4)
EOSINOPHIL NFR BLD: 0 % (ref 0–5)
ERYTHROCYTE [DISTWIDTH] IN BLOOD BY AUTOMATED COUNT: 14 % (ref 11.6–14.5)
GLUCOSE SERPL-MCNC: 109 MG/DL (ref 74–99)
HCT VFR BLD AUTO: 43.1 % (ref 35–45)
HGB BLD-MCNC: 14.6 G/DL (ref 12–16)
LYMPHOCYTES # BLD: 1.6 K/UL (ref 0.9–3.6)
LYMPHOCYTES NFR BLD: 27 % (ref 21–52)
MCH RBC QN AUTO: 30.5 PG (ref 24–34)
MCHC RBC AUTO-ENTMCNC: 33.9 G/DL (ref 31–37)
MCV RBC AUTO: 90 FL (ref 74–97)
MONOCYTES # BLD: 0.8 K/UL (ref 0.05–1.2)
MONOCYTES NFR BLD: 14 % (ref 3–10)
NEUTS SEG # BLD: 3.5 K/UL (ref 1.8–8)
NEUTS SEG NFR BLD: 58 % (ref 40–73)
PLATELET # BLD AUTO: 220 K/UL (ref 135–420)
PMV BLD AUTO: 8.6 FL (ref 9.2–11.8)
POTASSIUM SERPL-SCNC: 3.4 MMOL/L (ref 3.5–5.5)
RBC # BLD AUTO: 4.79 M/UL (ref 4.2–5.3)
SODIUM SERPL-SCNC: 144 MMOL/L (ref 136–145)
WBC # BLD AUTO: 6 K/UL (ref 4.6–13.2)

## 2018-01-10 PROCEDURE — 96361 HYDRATE IV INFUSION ADD-ON: CPT

## 2018-01-10 PROCEDURE — 74011250636 HC RX REV CODE- 250/636: Performed by: PHYSICIAN ASSISTANT

## 2018-01-10 PROCEDURE — 96375 TX/PRO/DX INJ NEW DRUG ADDON: CPT

## 2018-01-10 PROCEDURE — 99281 EMR DPT VST MAYX REQ PHY/QHP: CPT

## 2018-01-10 PROCEDURE — 80048 BASIC METABOLIC PNL TOTAL CA: CPT | Performed by: PHYSICIAN ASSISTANT

## 2018-01-10 PROCEDURE — 85025 COMPLETE CBC W/AUTO DIFF WBC: CPT | Performed by: PHYSICIAN ASSISTANT

## 2018-01-10 PROCEDURE — 96374 THER/PROPH/DIAG INJ IV PUSH: CPT

## 2018-01-10 RX ORDER — METHADONE HYDROCHLORIDE 10 MG/1
145 TABLET ORAL DAILY
COMMUNITY
End: 2018-01-10

## 2018-01-10 RX ORDER — METHADONE HYDROCHLORIDE 10 MG/ML
145 CONCENTRATE ORAL DAILY
COMMUNITY

## 2018-01-10 RX ORDER — KETOROLAC TROMETHAMINE 30 MG/ML
30 INJECTION, SOLUTION INTRAMUSCULAR; INTRAVENOUS
Status: COMPLETED | OUTPATIENT
Start: 2018-01-10 | End: 2018-01-10

## 2018-01-10 RX ORDER — PROCHLORPERAZINE EDISYLATE 5 MG/ML
5 INJECTION INTRAMUSCULAR; INTRAVENOUS
Status: COMPLETED | OUTPATIENT
Start: 2018-01-10 | End: 2018-01-10

## 2018-01-10 RX ORDER — DEXAMETHASONE SODIUM PHOSPHATE 4 MG/ML
6 INJECTION, SOLUTION INTRA-ARTICULAR; INTRALESIONAL; INTRAMUSCULAR; INTRAVENOUS; SOFT TISSUE ONCE
Status: COMPLETED | OUTPATIENT
Start: 2018-01-10 | End: 2018-01-10

## 2018-01-10 RX ORDER — DIPHENHYDRAMINE HYDROCHLORIDE 50 MG/ML
25 INJECTION, SOLUTION INTRAMUSCULAR; INTRAVENOUS ONCE
Status: COMPLETED | OUTPATIENT
Start: 2018-01-10 | End: 2018-01-10

## 2018-01-10 RX ADMIN — DEXAMETHASONE SODIUM PHOSPHATE 6 MG: 4 INJECTION, SOLUTION INTRAMUSCULAR; INTRAVENOUS at 09:53

## 2018-01-10 RX ADMIN — KETOROLAC TROMETHAMINE 30 MG: 30 INJECTION, SOLUTION INTRAMUSCULAR at 08:48

## 2018-01-10 RX ADMIN — SODIUM CHLORIDE 1000 ML: 900 INJECTION, SOLUTION INTRAVENOUS at 08:48

## 2018-01-10 RX ADMIN — PROCHLORPERAZINE EDISYLATE 5 MG: 5 INJECTION INTRAMUSCULAR; INTRAVENOUS at 08:48

## 2018-01-10 RX ADMIN — DIPHENHYDRAMINE HYDROCHLORIDE 25 MG: 50 INJECTION, SOLUTION INTRAMUSCULAR; INTRAVENOUS at 08:50

## 2018-01-10 NOTE — ED PROVIDER NOTES
EMERGENCY DEPARTMENT HISTORY AND PHYSICAL EXAM    Date: 1/10/2018  Patient Name: Izabella Perez    History of Presenting Illness     Chief Complaint   Patient presents with    Headache         History Provided By: Patient    Chief Complaint: HA  Duration: 5 Days  Timing:  Gradual and Worsening  Location: Frontal and temple head  Severity: 10 out of 10  Modifying Factors: No alleviation with Fioricet. Short term alleviation with Morphine and Toradol. Associated Symptoms: photophobia, nausea, and 2 episodes of vomiting    Additional History (Context):   8:13 AM  Izabella Perez is a 65 y.o. female with PMHx of arthritis, chronic pain, fibromyalgia, and COPD who presents to the emergency department C/O gradually worsening frontal and temple HA (10/10) radiating to he posterior head onset 5 days ago. Associated sxs include photophobia, nausea, and 2 episodes of vomiting. No alleviation with Fioricet. Short term alleviation (~1 hour) with Morphine and Toradol. States she has not been able to take the Prednisone due to the vomiting. Reports Hx of migraines but states this one feels different than her typical migraines. Denies having a neurologist. Pt reports this is her third visit for the same HA. Pt states she had a tooth pulled 2 weeks ago. Is a patient of a methadone clinic and takes 145 mg Methadone daily. States she did not get her dose of methadone today because she was worried she would \"throw it up. \" NKDA. PSHx of brain surgery and back surgery. Pt denies fever, visual disturbance, cough, congestion, sore throat, head injury, and any other sxs or complaints. PCP: None    Current Outpatient Prescriptions   Medication Sig Dispense Refill    methadone (DOLOPHINE) 10 mg/mL solution Take 145 mg by mouth daily. [facility] [address]       predniSONE (STERAPRED DS) 10 mg dose pack 6,5,4,3,2,1 taper 21 Tab 0    methocarbamol (ROBAXIN-750) 750 mg tablet Take 1 Tab by mouth three (3) times daily. 20 Tab 0    butalbital-acetaminophen-caff (FIORICET) -40 mg per capsule Take 1 Cap by mouth every four (4) hours as needed for Pain. 12 Cap 0       Past History     Past Medical History:  Past Medical History:   Diagnosis Date    Arthritis     Chronic obstructive pulmonary disease (HCC)     Chronic pain     Fibromyalgia     Ill-defined condition     geneva       Past Surgical History:  Past Surgical History:   Procedure Laterality Date    HX ORTHOPAEDIC      back    NEUROLOGICAL PROCEDURE UNLISTED      brain surgery       Family History:  History reviewed. No pertinent family history. Social History:  Social History   Substance Use Topics    Smoking status: Current Every Day Smoker     Packs/day: 0.50    Smokeless tobacco: Never Used    Alcohol use No       Allergies:  No Known Allergies      Review of Systems   Review of Systems   Constitutional: Negative for fever. HENT: Negative for congestion and sore throat. Eyes: Positive for photophobia. Negative for visual disturbance. Respiratory: Negative for cough. Gastrointestinal: Positive for nausea and vomiting. Neurological: Positive for headaches (frontal and temple). All other systems reviewed and are negative. Physical Exam     Vitals:    01/10/18 0807 01/10/18 1030   BP: 107/71 102/68   Pulse: (!) 59 64   Resp: 16 16   Temp: 97.5 °F (36.4 °C)    SpO2: 100% 99%   Weight: 45.4 kg (100 lb)    Height: 5' 1\" (1.549 m)      Physical Exam   Nursing note and vitals reviewed. Vital signs and nursing notes reviewed. CONSTITUTIONAL: Alert. Older than stated age; well-nourished; in moderate pain distress. HEAD: Normocephalic; atraumatic. EYES: PERRL; EOM's intact. No nystagmus. Conjunctiva clear. No ptosis. ENT: TM's normal. External ear normal. Normal nose; no rhinorrhea. Normal pharynx. Moist mucus membranes. NECK: Supple; FROM without difficulty, non-tender; no cervical lymphadenopathy.   No JVD, no cartic bruit. CV: Normal S1, S2; no murmurs, rubs, or gallops. No chest wall tenderness. RESPIRATORY: Normal chest excursion with respiration; breath sounds clear and equal bilaterally; no wheezes, rhonchi, or rales. GI: Normal bowel sounds; non-distended; non-tender; no guarding or rigidity; no palpable organomegaly. No CVA tenderness. BACK:  No evidence of trauma or deformity. Non-tender to palpation. FROM without difficulty. Negative straight leg raise bilaterally. EXT: Normal ROM in all four extremities; non-tender to palpation. SKIN: Normal for age and race; warm; dry; good turgor; no apparent lesions or exudate. NEURO: A & O x3. Cranial nerves 2-12 intact. Motor 5/5 bilaterally. Sensation intact. Normal speech. Normal finger to nose. PSYCH:  Anxious mood and affect. Diagnostic Study Results     Labs -     Recent Results (from the past 12 hour(s))   CBC WITH AUTOMATED DIFF    Collection Time: 01/10/18  8:35 AM   Result Value Ref Range    WBC 6.0 4.6 - 13.2 K/uL    RBC 4.79 4.20 - 5.30 M/uL    HGB 14.6 12.0 - 16.0 g/dL    HCT 43.1 35.0 - 45.0 %    MCV 90.0 74.0 - 97.0 FL    MCH 30.5 24.0 - 34.0 PG    MCHC 33.9 31.0 - 37.0 g/dL    RDW 14.0 11.6 - 14.5 %    PLATELET 479 888 - 647 K/uL    MPV 8.6 (L) 9.2 - 11.8 FL    NEUTROPHILS 58 40 - 73 %    LYMPHOCYTES 27 21 - 52 %    MONOCYTES 14 (H) 3 - 10 %    EOSINOPHILS 0 0 - 5 %    BASOPHILS 1 0 - 2 %    ABS. NEUTROPHILS 3.5 1.8 - 8.0 K/UL    ABS. LYMPHOCYTES 1.6 0.9 - 3.6 K/UL    ABS. MONOCYTES 0.8 0.05 - 1.2 K/UL    ABS. EOSINOPHILS 0.0 0.0 - 0.4 K/UL    ABS.  BASOPHILS 0.0 0.0 - 0.06 K/UL    DF AUTOMATED     METABOLIC PANEL, BASIC    Collection Time: 01/10/18  8:35 AM   Result Value Ref Range    Sodium 144 136 - 145 mmol/L    Potassium 3.4 (L) 3.5 - 5.5 mmol/L    Chloride 105 100 - 108 mmol/L    CO2 28 21 - 32 mmol/L    Anion gap 11 3.0 - 18 mmol/L    Glucose 109 (H) 74 - 99 mg/dL    BUN 11 7.0 - 18 MG/DL    Creatinine 0.85 0.6 - 1.3 MG/DL BUN/Creatinine ratio 13 12 - 20      GFR est AA >60 >60 ml/min/1.73m2    GFR est non-AA >60 >60 ml/min/1.73m2    Calcium 9.1 8.5 - 10.1 MG/DL       Radiologic Studies -   No orders to display     CT Results  (Last 48 hours)    None        CXR Results  (Last 48 hours)    None          Medications given in the ED-  Medications   diphenhydrAMINE (BENADRYL) injection 25 mg (25 mg IntraVENous Given 1/10/18 0850)   ketorolac (TORADOL) injection 30 mg (30 mg IntraVENous Given 1/10/18 0848)   prochlorperazine (COMPAZINE) injection 5 mg (5 mg IntraVENous Given 1/10/18 0848)   sodium chloride 0.9 % bolus infusion 1,000 mL (0 mL IntraVENous IV Completed 1/10/18 0954)   dexamethasone (DECADRON) 4 mg/mL injection 6 mg (6 mg IntraVENous Given 1/10/18 0953)         Medical Decision Making   I am the first provider for this patient. I reviewed the vital signs, available nursing notes, past medical history, past surgical history, family history and social history. Vital Signs-Reviewed the patient's vital signs. Pulse Oximetry Analysis - 100% on RA. Records Reviewed: Nursing Notes and Old Medical Records    Procedures:  Procedures    ED Course:   8:13 AM Initial assessment performed. The patients presenting problems have been discussed, and they are in agreement with the care plan formulated and outlined with them. I have encouraged them to ask questions as they arise throughout their visit. PROGRESS NOTE:   9:311 AM  Pt has been re-examined by [employer] RAYMON Reid. Pt states her HA is no different. Occasionally crying out in pain. It has only been ~30 minutes since she received Toradol, Benadryl and Compazine. Her nausea has improved and she wants something to drink. Will try another medication and reassess. I do not suspect withdrawal as she states she receives methadone 145 mg every morning through Dr. Ximena Briceño and has not missed any doses.      CONSULT NOTE:   10:10 AM  Tech Windy Reid PA-C spoke with Gyala Tomas MD   Specialty: ED Attending  Discussed pt's hx, disposition, and available diagnostic and imaging results  in person. Reviewed care plans. Consulting physician agrees LP or further imaging not indicated for this tension type HA. PROGRESS NOTE:   10:19 AM  Pt has been re-examined by [employer]. Pt is feeling much better and is ready to be discharged. She is calling her ride so that does not miss her daily methadone dosing. Maribel, the Pharmacist, spoke to them and they will wait for her to get there. She has Prednisone pack and Fioricet to use as needed.  has been involved for follow up. Diagnosis and Disposition       DISCHARGE NOTE:  10:20 AM  Erinn Alejandra's  results have been reviewed with her. She has been counseled regarding her diagnosis, treatment, and plan. She verbally conveys understanding and agreement of the signs, symptoms, diagnosis, treatment and prognosis and additionally agrees to follow up as discussed. She also agrees with the care-plan and conveys that all of her questions have been answered. I have also provided discharge instructions for her that include: educational information regarding their diagnosis and treatment, and list of reasons why they would want to return to the ED prior to their follow-up appointment, should her condition change. She has been provided with education for proper emergency department utilization. CLINICAL IMPRESSION:    1. Nonintractable headache, unspecified chronicity pattern, unspecified headache type        PLAN:  1. D/C Home  2. Discharge Medication List as of 1/10/2018 10:20 AM        3.    Follow-up Information     Follow up With Details Comments Contact Info    [facility] Schedule an appointment as soon as possible for a visit in 3 days For primary care follow up. [address] [facility], [address] [facility] [address]    [facility] EMERGENCY DEPT Go to As needed, If symptoms worsen [address] [facility] [address]  [phone]        _______________________________    Attestations: This note is prepared by Mikala Cano, acting as Scribe for [employer]. [employer], RAYMON:  The scribe's documentation has been prepared under my direction and personally reviewed by me in its entirety.   I confirm that the note above accurately reflects all work, treatment, procedures, and medical decision making performed by me.  _______________________________

## 2018-01-10 NOTE — ED NOTES
I have reviewed discharge instructions with the patient. The patient verbalized understanding.   Sister here to drive to clinic for methadone dosing;

## 2018-01-10 NOTE — PROGRESS NOTES
This is patient's third visit in 4 days for c/o of headache. In prior visits pt did not reveal she is on daily methadone 145mg from [address]. I spoke w/ pt who states she did not get her dose today and that she has been taking the same dose for ~ 1 year. I spoke w/ ARNAV Rivera at [facility] [place] who confirms dose and states they refused to dose her today due to low oxygen level, RR of 22 shaking, flulike sx and barely able to stand up. ARNAV Chris states they sent her over here to the ED. They will dose her today, they close at 10:30 am.  Her last urine tox screen at the clinic was 12/12/17 and was clean, they typically screen ~ 1xmonth. They will screen her today before dosing. Discussed w/ ED provider Swetha Shahid who will discharge pt now. Pt calling her daughter who lives \"around the corner\" to come get her to take her to clinic for methadone dose. Spoke w/ ARNAV Rivera at the clinic, they will stay after clinic closing ([phone]) awaiting her arrival to dose her today.     [facility] Pharmacist  [phone]

## 2018-01-10 NOTE — DISCHARGE INSTRUCTIONS

## 2018-01-10 NOTE — ED TRIAGE NOTES
Pt states this is her 3rd visit for the same headache. Pt states the Fioricet is not helping with the headache. Pt states the only thing that helped was the \"IV\". Pt patient of methadone clinic. Pt takes 145mg Methadone daily. Pt did not get dose of methadone today. Pt states she was worried she would \"throw it up\".

## 2018-01-10 NOTE — PROGRESS NOTES
This is patient's third visit in 4 days for c/o of headache. In prior visits pt did not reveal she is on daily methadone 145mg from [address]. I spoke w/ pt who states she did not get her dose today and that she has been taking the same dose for ~ 1 year. I spoke w/ ARNAV Rivera at [facility] [place] who confirms dose and states they refused to dose her today due to low oxygen level, RR of 22 shaking, flulike sx and barely able to stand up. ARNAV Perkins states they sent her over here to the ED. They will dose her today, they close at 10:30 am.  Her last urine tox screen at the clinic was 12/12/17 and was clean, they typically screen ~ 1xmonth. They will screen her today before dosing. Discussed w/ ED provider Niru Sanchez.     [address] [place] [address] Pharmacist  [phone]

## 2018-04-18 ENCOUNTER — HOSPITAL ENCOUNTER (OUTPATIENT)
Dept: NON INVASIVE DIAGNOSTICS | Age: 63
Discharge: HOME | End: 2018-04-18
Payer: SELF-PAY

## 2018-04-18 PROCEDURE — 93005 ELECTROCARDIOGRAM TRACING: CPT

## 2018-04-19 LAB
ATRIAL RATE: 64 BPM
CALCULATED P AXIS, ECG09: 84 DEGREES
CALCULATED R AXIS, ECG10: -72 DEGREES
CALCULATED T AXIS, ECG11: 83 DEGREES
DIAGNOSIS, 93000: NORMAL
P-R INTERVAL, ECG05: 160 MS
Q-T INTERVAL, ECG07: 450 MS
QRS DURATION, ECG06: 90 MS
QTC CALCULATION (BEZET), ECG08: 464 MS
VENTRICULAR RATE, ECG03: 64 BPM

## 2019-03-27 ENCOUNTER — HOSPITAL ENCOUNTER (OUTPATIENT)
Dept: NON INVASIVE DIAGNOSTICS | Age: 64
Discharge: HOME | End: 2019-03-27
Payer: SELF-PAY

## 2019-03-27 DIAGNOSIS — Z13.6 SCREENING FOR ISCHEMIC HEART DISEASE: ICD-10-CM

## 2019-03-27 PROCEDURE — 93005 ELECTROCARDIOGRAM TRACING: CPT

## 2019-03-29 LAB
ATRIAL RATE: 67 BPM
CALCULATED P AXIS, ECG09: 79 DEGREES
CALCULATED R AXIS, ECG10: -59 DEGREES
CALCULATED T AXIS, ECG11: 65 DEGREES
DIAGNOSIS, 93000: NORMAL
P-R INTERVAL, ECG05: 164 MS
Q-T INTERVAL, ECG07: 438 MS
QRS DURATION, ECG06: 90 MS
QTC CALCULATION (BEZET), ECG08: 462 MS
VENTRICULAR RATE, ECG03: 67 BPM

## 2019-05-16 ENCOUNTER — HOSPITAL ENCOUNTER (EMERGENCY)
Age: 64
Discharge: HOME | End: 2019-05-16
Attending: EMERGENCY MEDICINE
Payer: COMMERCIAL

## 2019-05-16 ENCOUNTER — APPOINTMENT (OUTPATIENT)
Dept: CT IMAGING | Age: 64
End: 2019-05-16
Attending: EMERGENCY MEDICINE
Payer: COMMERCIAL

## 2019-05-16 VITALS
DIASTOLIC BLOOD PRESSURE: 68 MMHG | SYSTOLIC BLOOD PRESSURE: 107 MMHG | OXYGEN SATURATION: 99 % | HEART RATE: 94 BPM | RESPIRATION RATE: 16 BRPM | TEMPERATURE: 98 F | WEIGHT: 100 LBS | HEIGHT: 61 IN | BODY MASS INDEX: 18.88 KG/M2

## 2019-05-16 DIAGNOSIS — R19.7 VOMITING AND DIARRHEA: ICD-10-CM

## 2019-05-16 DIAGNOSIS — A08.4 VIRAL GASTROENTERITIS: ICD-10-CM

## 2019-05-16 DIAGNOSIS — F17.210 CIGARETTE NICOTINE DEPENDENCE WITHOUT COMPLICATION: Primary | ICD-10-CM

## 2019-05-16 DIAGNOSIS — R11.10 VOMITING AND DIARRHEA: ICD-10-CM

## 2019-05-16 LAB
ALBUMIN SERPL-MCNC: 3.9 G/DL (ref 3.4–5)
ALBUMIN/GLOB SERPL: 1.1 {RATIO} (ref 0.8–1.7)
ALP SERPL-CCNC: 117 U/L (ref 45–117)
ALT SERPL-CCNC: 17 U/L (ref 13–56)
ANION GAP SERPL CALC-SCNC: 10 MMOL/L (ref 3–18)
APPEARANCE UR: CLEAR
AST SERPL-CCNC: 21 U/L (ref 15–37)
BACTERIA URNS QL MICRO: ABNORMAL /HPF
BASOPHILS # BLD: 0 K/UL (ref 0–0.1)
BASOPHILS NFR BLD: 0 % (ref 0–2)
BILIRUB SERPL-MCNC: 0.7 MG/DL (ref 0.2–1)
BILIRUB UR QL: NEGATIVE
BUN SERPL-MCNC: 18 MG/DL (ref 7–18)
BUN/CREAT SERPL: 21 (ref 12–20)
CALCIUM SERPL-MCNC: 9.6 MG/DL (ref 8.5–10.1)
CHLORIDE SERPL-SCNC: 104 MMOL/L (ref 100–108)
CO2 SERPL-SCNC: 26 MMOL/L (ref 21–32)
COLOR UR: YELLOW
CREAT SERPL-MCNC: 0.84 MG/DL (ref 0.6–1.3)
DIFFERENTIAL METHOD BLD: ABNORMAL
EOSINOPHIL # BLD: 0 K/UL (ref 0–0.4)
EOSINOPHIL NFR BLD: 0 % (ref 0–5)
EPITH CASTS URNS QL MICRO: ABNORMAL /LPF (ref 0–5)
ERYTHROCYTE [DISTWIDTH] IN BLOOD BY AUTOMATED COUNT: 14.1 % (ref 11.6–14.5)
GLOBULIN SER CALC-MCNC: 3.4 G/DL (ref 2–4)
GLUCOSE SERPL-MCNC: 147 MG/DL (ref 74–99)
GLUCOSE UR STRIP.AUTO-MCNC: NEGATIVE MG/DL
HCT VFR BLD AUTO: 45.5 % (ref 35–45)
HGB BLD-MCNC: 15.3 G/DL (ref 12–16)
HGB UR QL STRIP: ABNORMAL
KETONES UR QL STRIP.AUTO: ABNORMAL MG/DL
LEUKOCYTE ESTERASE UR QL STRIP.AUTO: NEGATIVE
LIPASE SERPL-CCNC: 83 U/L (ref 73–393)
LYMPHOCYTES # BLD: 0.4 K/UL (ref 0.9–3.6)
LYMPHOCYTES NFR BLD: 3 % (ref 21–52)
MAGNESIUM SERPL-MCNC: 1.9 MG/DL (ref 1.6–2.6)
MCH RBC QN AUTO: 29.7 PG (ref 24–34)
MCHC RBC AUTO-ENTMCNC: 33.6 G/DL (ref 31–37)
MCV RBC AUTO: 88.3 FL (ref 74–97)
MONOCYTES # BLD: 0.4 K/UL (ref 0.05–1.2)
MONOCYTES NFR BLD: 3 % (ref 3–10)
MUCOUS THREADS URNS QL MICRO: ABNORMAL /LPF
NEUTS SEG # BLD: 12.6 K/UL (ref 1.8–8)
NEUTS SEG NFR BLD: 94 % (ref 40–73)
NITRITE UR QL STRIP.AUTO: NEGATIVE
PH UR STRIP: 5 [PH] (ref 5–8)
PLATELET # BLD AUTO: 221 K/UL (ref 135–420)
PMV BLD AUTO: 9.2 FL (ref 9.2–11.8)
POTASSIUM SERPL-SCNC: 4.2 MMOL/L (ref 3.5–5.5)
PROT SERPL-MCNC: 7.3 G/DL (ref 6.4–8.2)
PROT UR STRIP-MCNC: NEGATIVE MG/DL
RBC # BLD AUTO: 5.15 M/UL (ref 4.2–5.3)
RBC #/AREA URNS HPF: ABNORMAL /HPF (ref 0–5)
SODIUM SERPL-SCNC: 140 MMOL/L (ref 136–145)
SP GR UR REFRACTOMETRY: 1.02 (ref 1–1.03)
UROBILINOGEN UR QL STRIP.AUTO: 0.2 EU/DL (ref 0.2–1)
WBC # BLD AUTO: 13.5 K/UL (ref 4.6–13.2)
WBC URNS QL MICRO: ABNORMAL /HPF (ref 0–5)

## 2019-05-16 PROCEDURE — 74011636320 HC RX REV CODE- 636/320: Performed by: EMERGENCY MEDICINE

## 2019-05-16 PROCEDURE — 99283 EMERGENCY DEPT VISIT LOW MDM: CPT

## 2019-05-16 PROCEDURE — 85025 COMPLETE CBC W/AUTO DIFF WBC: CPT

## 2019-05-16 PROCEDURE — 96375 TX/PRO/DX INJ NEW DRUG ADDON: CPT

## 2019-05-16 PROCEDURE — 74011250636 HC RX REV CODE- 250/636: Performed by: EMERGENCY MEDICINE

## 2019-05-16 PROCEDURE — 83735 ASSAY OF MAGNESIUM: CPT

## 2019-05-16 PROCEDURE — 83690 ASSAY OF LIPASE: CPT

## 2019-05-16 PROCEDURE — 96366 THER/PROPH/DIAG IV INF ADDON: CPT

## 2019-05-16 PROCEDURE — 81001 URINALYSIS AUTO W/SCOPE: CPT

## 2019-05-16 PROCEDURE — 96365 THER/PROPH/DIAG IV INF INIT: CPT

## 2019-05-16 PROCEDURE — 74177 CT ABD & PELVIS W/CONTRAST: CPT

## 2019-05-16 PROCEDURE — 80053 COMPREHEN METABOLIC PANEL: CPT

## 2019-05-16 PROCEDURE — 96372 THER/PROPH/DIAG INJ SC/IM: CPT

## 2019-05-16 RX ORDER — ACETAMINOPHEN 10 MG/ML
500 INJECTION, SOLUTION INTRAVENOUS ONCE
Status: COMPLETED | OUTPATIENT
Start: 2019-05-16 | End: 2019-05-16

## 2019-05-16 RX ORDER — ONDANSETRON 2 MG/ML
4 INJECTION INTRAMUSCULAR; INTRAVENOUS
Status: COMPLETED | OUTPATIENT
Start: 2019-05-16 | End: 2019-05-16

## 2019-05-16 RX ORDER — ONDANSETRON 4 MG/1
4 TABLET, ORALLY DISINTEGRATING ORAL
Qty: 12 TAB | Refills: 0 | Status: SHIPPED | OUTPATIENT
Start: 2019-05-16 | End: 2022-05-18 | Stop reason: SDUPTHER

## 2019-05-16 RX ORDER — DICYCLOMINE HYDROCHLORIDE 10 MG/ML
20 INJECTION INTRAMUSCULAR
Status: COMPLETED | OUTPATIENT
Start: 2019-05-16 | End: 2019-05-16

## 2019-05-16 RX ORDER — FAMOTIDINE 10 MG/ML
20 INJECTION INTRAVENOUS
Status: COMPLETED | OUTPATIENT
Start: 2019-05-16 | End: 2019-05-16

## 2019-05-16 RX ADMIN — SODIUM CHLORIDE 1000 ML: 900 INJECTION, SOLUTION INTRAVENOUS at 09:55

## 2019-05-16 RX ADMIN — DICYCLOMINE HYDROCHLORIDE 20 MG: 20 INJECTION, SOLUTION INTRAMUSCULAR at 09:40

## 2019-05-16 RX ADMIN — FAMOTIDINE 20 MG: 10 INJECTION, SOLUTION INTRAVENOUS at 09:56

## 2019-05-16 RX ADMIN — IOPAMIDOL 100 ML: 612 INJECTION, SOLUTION INTRAVENOUS at 10:59

## 2019-05-16 RX ADMIN — ACETAMINOPHEN 500 MG: 10 INJECTION, SOLUTION INTRAVENOUS at 09:57

## 2019-05-16 RX ADMIN — ONDANSETRON 4 MG: 2 INJECTION INTRAMUSCULAR; INTRAVENOUS at 09:55

## 2019-05-16 NOTE — ED PROVIDER NOTES
EMERGENCY DEPARTMENT HISTORY AND PHYSICAL EXAM 
 
Date: 5/16/2019 Patient Name: Von Lopez History of Presenting Illness Chief Complaint Patient presents with  Vomiting History Provided By: Patient Additional History (Context):  
Von Lopez is a 59 y.o. female with PMHX of nicotine dependence, history of opiate abuse currently on methadone presents to the emergency department C/O nausea, vomiting, diarrhea, generalized abdominal pain that started today. Pt denies fever, dysuria, hematuria, chest pain, shortness of breath, and any other sxs or complaints. PCP: None Current Outpatient Medications Medication Sig Dispense Refill  ondansetron (ZOFRAN ODT) 4 mg disintegrating tablet Take 1 Tab by mouth every eight (8) hours as needed for Nausea. 12 Tab 0  
 methadone (DOLOPHINE) 10 mg/mL solution Take 145 mg by mouth daily. 5 [facility] [address] . Past History Past Medical History: 
Past Medical History:  
Diagnosis Date  Arthritis  Chronic obstructive pulmonary disease ([facility] [address]  Chronic pain  Fibromyalgia  Ill-defined condition   
 cynthiaulds Past Surgical History: 
Past Surgical History:  
Procedure Laterality Date  HX ORTHOPAEDIC    
 back  NEUROLOGICAL PROCEDURE UNLISTED    
 brain surgery Family History: 
History reviewed. No pertinent family history. Social History: 
Social History Tobacco Use  Smoking status: Current Every Day Smoker Packs/day: 0.50  Smokeless tobacco: Never Used Substance Use Topics  Alcohol use: No  
 Drug use: No  
 
 
Allergies: 
No Known Allergies Review of Systems Review of Systems Constitutional: Negative for chills and fever. HENT: Negative for congestion, ear pain, sinus pain and sore throat. Eyes: Negative for pain and visual disturbance. Respiratory: Negative for cough and shortness of breath. Cardiovascular: Negative for chest pain and leg swelling. Gastrointestinal: Positive for abdominal pain, diarrhea, nausea and vomiting. Negative for constipation. Genitourinary: Negative for dysuria, hematuria, vaginal bleeding and vaginal discharge. Musculoskeletal: Negative for back pain and neck pain. Skin: Negative for rash and wound. Neurological: Negative for dizziness, tremors, weakness, light-headedness and numbness. All other systems reviewed and are negative. Physical Exam  
 
Vitals:  
 05/16/19 9371 BP: 107/68 Pulse: 94 Resp: 16 Temp: 98 °F (36.7 °C) SpO2: 99% Weight: 45.4 kg (100 lb) Height: 5' 1\" (1.549 m) Physical Exam 
 
Nursing note and vitals reviewed Constitutional: Elderly  female, appears uncomfortable Head: Normocephalic, Atraumatic Eyes: Pupils are equal, round, and reactive to light, EOMI Neck: Supple, non-tender Cardiovascular: Regular rate and rhythm, no murmurs, rubs, or gallops Chest: Normal work of breathing and chest excursion bilaterally Lungs: Clear to ausculation bilaterally, no wheezes, no rhonchi Abdomen: Soft, mild diffuse tenderness with no rebound or guarding, non distended, normoactive bowel sounds Back: No evidence of trauma or deformity Extremities: No evidence of trauma or deformity, no LE edema Skin: Warm and dry, normal cap refill Neuro: Alert and appropriate, CN intact, normal speech, moving all 4 extremities freely and symmetrically Psychiatric: Normal mood and affect Diagnostic Study Results Labs - Recent Results (from the past 12 hour(s)) URINALYSIS W/ RFLX MICROSCOPIC Collection Time: 05/16/19  9:28 AM  
Result Value Ref Range Color YELLOW Appearance CLEAR Specific gravity 1.023 1.005 - 1.030    
 pH (UA) 5.0 5.0 - 8.0 Protein NEGATIVE  NEG mg/dL Glucose NEGATIVE  NEG mg/dL Ketone TRACE (A) NEG mg/dL Bilirubin NEGATIVE  NEG  Blood SMALL (A) NEG    
 Urobilinogen 0.2 0.2 - 1.0 EU/dL Nitrites NEGATIVE  NEG Leukocyte Esterase NEGATIVE  NEG    
URINE MICROSCOPIC ONLY Collection Time: 05/16/19  9:28 AM  
Result Value Ref Range WBC NONE 0 - 5 /hpf  
 RBC 0 to 2 0 - 5 /hpf Epithelial cells FEW 0 - 5 /lpf Bacteria FEW (A) NEG /hpf Mucus FEW (A) NEG /lpf  
CBC WITH AUTOMATED DIFF Collection Time: 05/16/19  9:50 AM  
Result Value Ref Range WBC 13.5 (H) 4.6 - 13.2 K/uL  
 RBC 5.15 4.20 - 5.30 M/uL  
 HGB 15.3 12.0 - 16.0 g/dL HCT 45.5 (H) 35.0 - 45.0 % MCV 88.3 74.0 - 97.0 FL  
 MCH 29.7 24.0 - 34.0 PG  
 MCHC 33.6 31.0 - 37.0 g/dL  
 RDW 14.1 11.6 - 14.5 % PLATELET 216 332 - 447 K/uL MPV 9.2 9.2 - 11.8 FL  
 NEUTROPHILS 94 (H) 40 - 73 % LYMPHOCYTES 3 (L) 21 - 52 % MONOCYTES 3 3 - 10 % EOSINOPHILS 0 0 - 5 % BASOPHILS 0 0 - 2 %  
 ABS. NEUTROPHILS 12.6 (H) 1.8 - 8.0 K/UL  
 ABS. LYMPHOCYTES 0.4 (L) 0.9 - 3.6 K/UL  
 ABS. MONOCYTES 0.4 0.05 - 1.2 K/UL  
 ABS. EOSINOPHILS 0.0 0.0 - 0.4 K/UL  
 ABS. BASOPHILS 0.0 0.0 - 0.1 K/UL  
 DF AUTOMATED METABOLIC PANEL, COMPREHENSIVE Collection Time: 05/16/19  9:50 AM  
Result Value Ref Range Sodium 140 136 - 145 mmol/L Potassium 4.2 3.5 - 5.5 mmol/L Chloride 104 100 - 108 mmol/L  
 CO2 26 21 - 32 mmol/L Anion gap 10 3.0 - 18 mmol/L Glucose 147 (H) 74 - 99 mg/dL BUN 18 7.0 - 18 MG/DL Creatinine 0.84 0.6 - 1.3 MG/DL  
 BUN/Creatinine ratio 21 (H) 12 - 20 GFR est AA >60 >60 ml/min/1.73m2 GFR est non-AA >60 >60 ml/min/1.73m2 Calcium 9.6 8.5 - 10.1 MG/DL Bilirubin, total 0.7 0.2 - 1.0 MG/DL  
 ALT (SGPT) 17 13 - 56 U/L  
 AST (SGOT) 21 15 - 37 U/L Alk. phosphatase 117 45 - 117 U/L Protein, total 7.3 6.4 - 8.2 g/dL Albumin 3.9 3.4 - 5.0 g/dL Globulin 3.4 2.0 - 4.0 g/dL A-G Ratio 1.1 0.8 - 1.7 LIPASE Collection Time: 05/16/19  9:50 AM  
Result Value Ref Range Lipase 83 73 - 393 U/L MAGNESIUM  
 Collection Time: 05/16/19  9:50 AM  
Result Value Ref Range Magnesium 1.9 1.6 - 2.6 mg/dL Radiologic Studies -  
CT ABD PELV W CONT Final Result IMPRESSION:  
  
1. Liquid stool throughout the large intestine, as can be seen in the context  
of a diarrheal state. No significant bowel wall thickening or dilatation. Normal  
appendix. No bowel obstruction. 2. Mild intrahepatic and extra hepatic biliary ductal prominence as described  
above, with smooth tapering of the common bile duct in the ectatic head. No  
pancreatic ductal dilatation. Overall etiology is indeterminate, without  
evidence of radiopaque choledocholithiasis. Correlation with liver function  
tests recommended. Further consideration for outpatient MRCP evaluation as  
appropriate clinically. CT Results  (Last 48 hours) 05/16/19 1112  CT ABD PELV W CONT Final result Impression:  IMPRESSION:  
   
1. Liquid stool throughout the large intestine, as can be seen in the context  
of a diarrheal state. No significant bowel wall thickening or dilatation. Normal  
appendix. No bowel obstruction. 2. Mild intrahepatic and extra hepatic biliary ductal prominence as described  
above, with smooth tapering of the common bile duct in the ectatic head. No  
pancreatic ductal dilatation. Overall etiology is indeterminate, without  
evidence of radiopaque choledocholithiasis. Correlation with liver function  
tests recommended. Further consideration for outpatient MRCP evaluation as  
appropriate clinically. Narrative:  EXAM: CT of the abdomen and pelvis INDICATION: Nausea, vomiting, upper abdominal pain COMPARISON: None. TECHNIQUE: Axial CT imaging of the abdomen and pelvis was performed without oral  
and with intravenous contrast. Multiplanar reformats were generated. One or more dose reduction techniques were used on this CT: automated exposure control, adjustment of the mAs and/or kVp according to patient size, and  
iterative reconstruction techniques. The specific techniques used on this CT  
exam have been documented in the patient's electronic medical record. Digital  
Imaging and Communications in Medicine (DICOM) format image data are available  
to nonaffiliated external healthcare facilities or entities on a secure, media  
free, reciprocally searchable basis with patient authorization for at least a  
12-month period after this study. _______________ FINDINGS:  
   
LOWER CHEST: Lung bases are clear. Normal cardiac size. No pericardial effusion. LIVER, BILIARY: Liver is normal. There is mild intrahepatic biliary ductal  
prominence noted along with enlargement of the common bile duct which  
demonstrates smooth tapering in the region of the pancreatic head. No evidence  
of radiopaque choledocholithiasis. Common bile duct diameter estimated at  
approximately 1 cm. Gallbladder is unremarkable. PANCREAS: Normal pancreatic enhancement. No pancreatic ductal dilatation. SPLEEN: Normal.  
   
ADRENALS: Normal.  
   
KIDNEYS/URETERS/BLADDER: Symmetric renal enhancement. There is an early  
excretory phase to renal contrast enhancement which is technical in nature. No  
hydronephrosis. No definite urolithiasis. Urinary bladder normal in CT  
appearance. Small anterior interpolar right renal hypodensity is present, too  
small to further characterize, but statistically favored to reflect a small  
cyst.  
   
PELVIC ORGANS: Unremarkable. VASCULATURE: Aortobiiliac atherosclerotic calcification is present without  
evidence of aneurysmal dilatation. LYMPH NODES: No enlarged lymph nodes. GASTROINTESTINAL TRACT: No focal bowel wall thickening or dilatation is  
demonstrated. Liquid stool is noted throughout the majority of the proximal  
large intestine without associated pericolonic inflammation.  Normal caliber  
appendix. No bowel obstruction. No free intraperitoneal gas. BONES: No acute or aggressive osseous abnormalities identified. Mild lower  
lumbar spondylosis and facet joint osteoarthrosis is present. Transitional  
lumbosacral anatomy present with a hypertrophied transverse process of the right L5 vertebral body and associated lumbosacral assimilation joint. Chronic  
fracture deformity of the medial right superior pubic ramus and pubic body  
demonstrated. OTHER: None.  
   
_______________ CXR Results  (Last 48 hours) None Medical Decision Making I am the first provider for this patient. I reviewed the vital signs, available nursing notes, past medical history, past surgical history, family history and social history. Vital Signs-Reviewed the patient's vital signs. Pulse Oximetry Analysis -99 % on room air Records Reviewed: Nursing Notes and Old Medical Records Provider Notes:  
59 y.o. female presenting with vomiting, diarrhea, generalized abdominal pain. On exam patient appears uncomfortable. However afebrile, not tachycardic and tensive. Abdomen is soft nondistended however with diffuse mild tenderness. No rebound or guarding. Will obtain labs and a CT scan. Will treat symptomatically. Procedures: 
Procedures ED Course:  
9:16 AM Initial assessment performed. The patients presenting problems have been discussed, and they are in agreement with the care plan formulated and outlined with them. I have encouraged them to ask questions as they arise throughout their visit. SMOKING CESSATION: 
The patient was counseled on the dangers of tobacco use, and was advised to quit. Reviewed strategies to maximize success, including removing cigarettes and smoking materials from environment. Discussion took 3-5 minutes, and pt expressed understanding. 11:39 AM 
Labs with mild leukocytosis of 13.5. No metabolic derangements.   Lipase within normal limits. UA not consistent with UTI. CT showing diarrheal state with no bowel thickening. Incidental intrahepatic and extrahepatic ductal prominence. On reassessment, patient reports improved symptoms. Discussed incidental finding with patient and urged close follow-up with gastroenterology. Will be referred to Dr. Richard Jordan for follow-up. Patient symptoms likely viral gastroenteritis. Will discharge with symptom control. Diagnosis and Disposition DISCHARGE NOTE: 
11:39 AM 
 
Regis Alejandra's  results have been reviewed with her. She has been counseled regarding her diagnosis, treatment, and plan. She verbally conveys understanding and agreement of the signs, symptoms, diagnosis, treatment and prognosis and additionally agrees to follow up as discussed. She also agrees with the care-plan and conveys that all of her questions have been answered. I have also provided discharge instructions for her that include: educational information regarding their diagnosis and treatment, and list of reasons why they would want to return to the ED prior to their follow-up appointment, should her condition change. She has been provided with education for proper emergency department utilization. CLINICAL IMPRESSION: 
 
1. Cigarette nicotine dependence without complication 2. Viral gastroenteritis 3. Vomiting and diarrhea PLAN: 
1. D/C Home 2. Current Discharge Medication List  
  
START taking these medications Details  
ondansetron (ZOFRAN ODT) 4 mg disintegrating tablet Take 1 Tab by mouth every eight (8) hours as needed for Nausea. Qty: 12 Tab, Refills: 0  
  
  
 
3. Follow-up Information Follow up With Specialties Details Why Contact Info [facility]  Schedule an appointment as soon as possible for a visit in 2 days   [address] [place], [address] [place] [address] [facility] [address] 
[phone] Chloe Del Rio MD Gastroenterology Schedule an appointment as soon as possible for a visit in 2 days  [place] [address] [address] [place] [address] 
[phone] [facility] EMERGENCY DEPT Emergency Medicine  As needed if symptoms worsen [address] [address] 
[phone]  
  
 
____________________________________ Please note that this dictation was completed with [employer], the computer voice recognition software. Quite often unanticipated grammatical, syntax, homophones, and other interpretive errors are inadvertently transcribed by the computer software. Please disregard these errors. Please excuse any errors that have escaped final proofreading.

## 2019-05-16 NOTE — DISCHARGE INSTRUCTIONS
You were seen and evaluated in the Emergency Department. Please understand that your work up is not all encompassing and you should follow up with your primary care physician for further management and continuity of care. Please return to Emergency Department or seek medical attention immediately if you have acute worsening in your symptoms or develop chest pain, shortness of breath, repeated vomiting, fever, altered level of consciousness, coughing up blood, or start sweating and feel clammy. If you were prescribed any medicine for home, please take as prescribed by your health-care provider. If you were given any follow-up appointments or numbers to call, please do so as instructed. Avoid any tobacco products or excessive alcohol. Patient Education        Diarrhea: Care Instructions  Your Care Instructions    Diarrhea is loose, watery stools (bowel movements). The exact cause is often hard to find. Sometimes diarrhea is your body's way of getting rid of what caused an upset stomach. Viruses, food poisoning, and many medicines can cause diarrhea. Some people get diarrhea in response to emotional stress, anxiety, or certain foods. Almost everyone has diarrhea now and then. It usually isn't serious, and your stools will return to normal soon. The important thing to do is replace the fluids you have lost, so you can prevent dehydration. The doctor has checked you carefully, but problems can develop later. If you notice any problems or new symptoms, get medical treatment right away. Follow-up care is a key part of your treatment and safety. Be sure to make and go to all appointments, and call your doctor if you are having problems. It's also a good idea to know your test results and keep a list of the medicines you take. How can you care for yourself at home? · Watch for signs of dehydration, which means your body has lost too much water.  Dehydration is a serious condition and should be treated right away. Signs of dehydration are:  ? Increasing thirst and dry eyes and mouth. ? Feeling faint or lightheaded. ? Darker urine, and a smaller amount of urine than normal.  · To prevent dehydration, drink plenty of fluids, enough so that your urine is light yellow or clear like water. Choose water and other caffeine-free clear liquids until you feel better. If you have kidney, heart, or liver disease and have to limit fluids, talk with your doctor before you increase the amount of fluids you drink. · Begin eating small amounts of mild foods the next day, if you feel like it. ? Try yogurt that has live cultures of Lactobacillus. (Check the label.)  ? Avoid spicy foods, fruits, alcohol, and caffeine until 48 hours after all symptoms are gone. ? Avoid chewing gum that contains sorbitol. ? Avoid dairy products (except for yogurt with Lactobacillus) while you have diarrhea and for 3 days after symptoms are gone. · The doctor may recommend that you take over-the-counter medicine, such as loperamide (Imodium), if you still have diarrhea after 6 hours. Read and follow all instructions on the label. Do not use this medicine if you have bloody diarrhea, a high fever, or other signs of serious illness. Call your doctor if you think you are having a problem with your medicine. When should you call for help? Call 911 anytime you think you may need emergency care. For example, call if:    · You passed out (lost consciousness).     · Your stools are maroon or very bloody.    Call your doctor now or seek immediate medical care if:    · You are dizzy or lightheaded, or you feel like you may faint.     · Your stools are black and look like tar, or they have streaks of blood.     · You have new or worse belly pain.     · You have symptoms of dehydration, such as:  ? Dry eyes and a dry mouth. ? Passing only a little dark urine. ?  Feeling thirstier than usual.     · You have a new or higher fever.    Watch closely for changes in your health, and be sure to contact your doctor if:    · Your diarrhea is getting worse.     · You see pus in the diarrhea.     · You are not getting better after 2 days (48 hours). Where can you learn more? Go to http://sanjiv-kenny.info/. Enter X861 in the search box to learn more about \"Diarrhea: Care Instructions. \"  Current as of: September 23, 2018  Content Version: 11.9  © 7639-6939 [employer]. Care instructions adapted under license by [employer] (which disclaims liability or warranty for this information). If you have questions about a medical condition or this instruction, always ask your healthcare professional. [place] [address] any warranty or liability for your use of this information. Patient Education        Gastroenteritis: Care Instructions  Your Care Instructions    Gastroenteritis is an illness that may cause nausea, vomiting, and diarrhea. It is sometimes called \"stomach flu. \" It can be caused by bacteria or a virus. You will probably begin to feel better in 1 to 2 days. In the meantime, get plenty of rest and make sure you do not become dehydrated. Dehydration occurs when your body loses too much fluid. Follow-up care is a key part of your treatment and safety. Be sure to make and go to all appointments, and call your doctor if you are having problems. It's also a good idea to know your test results and keep a list of the medicines you take. How can you care for yourself at home? · If your doctor prescribed antibiotics, take them as directed. Do not stop taking them just because you feel better. You need to take the full course of antibiotics. · Drink plenty of fluids to prevent dehydration, enough so that your urine is light yellow or clear like water. Choose water and other caffeine-free clear liquids until you feel better.  If you have kidney, heart, or liver disease and have to limit fluids, talk with your doctor before you increase your fluid intake. · Drink fluids slowly, in frequent, small amounts, because drinking too much too fast can cause vomiting. · Begin eating mild foods, such as dry toast, yogurt, applesauce, bananas, and rice. Avoid spicy, hot, or high-fat foods, and do not drink alcohol or caffeine for a day or two. Do not drink milk or eat ice cream until you are feeling better. How to prevent gastroenteritis  · Keep hot foods hot and cold foods cold. · Do not eat meats, dressings, salads, or other foods that have been kept at room temperature for more than 2 hours. · Use a thermometer to check your refrigerator. It should be between 34°F and 40°F.  · Defrost meats in the refrigerator or microwave, not on the kitchen counter. · Keep your hands and your kitchen clean. Wash your hands, cutting boards, and countertops with hot soapy water frequently. · Cook meat until it is well done. · Do not eat raw eggs or uncooked sauces made with raw eggs. · Do not take chances. If food looks or tastes spoiled, throw it out. When should you call for help? Call 911 anytime you think you may need emergency care. For example, call if:    · You vomit blood or what looks like coffee grounds.     · You passed out (lost consciousness).     · You pass maroon or very bloody stools.    Call your doctor now or seek immediate medical care if:    · You have severe belly pain.     · You have signs of needing more fluids.  You have sunken eyes, a dry mouth, and pass only a little dark urine.     · You feel like you are going to faint.     · You have increased belly pain that does not go away in 1 to 2 days.     · You have new or increased nausea, or you are vomiting.     · You have a new or higher fever.     · Your stools are black and tarlike or have streaks of blood.    Watch closely for changes in your health, and be sure to contact your doctor if:    · You are dizzy or lightheaded.     · You urinate less than usual, or your urine is dark yellow or brown.     · You do not feel better with each day that goes by. Where can you learn more? Go to http://sanjiv-kenny.info/. Enter N142 in the search box to learn more about \"Gastroenteritis: Care Instructions. \"  Current as of: July 30, 2018  Content Version: 11.9  © 2006-2018 [employer]. Care instructions adapted under license by [employer] (which disclaims liability or warranty for this information). If you have questions about a medical condition or this instruction, always ask your healthcare professional. [place] [address] any warranty or liability for your use of this information. Patient Education        Soft-Textured, Clarissa Pat Diet: Care Instructions  Your Care Instructions    A soft-textured, bland diet is used when you need food that is easy to chew, swallow, and digest. You will need to choose soft foods that are low in spices and seasonings. You will need to avoid high-fat foods, as well as caffeine and alcohol. Your doctor or dietitian can help you plan a soft-textured, bland diet based on your health and what you prefer to eat. Ask your doctor how long you should stay on this diet. As you get better, you will probably be able to go back to a regular diet. Talk with your doctor or dietitian before you make changes in your diet. Follow-up care is a key part of your treatment and safety. Be sure to make and go to all appointments, and call your doctor if you are having problems. It's also a good idea to know your test results and keep a list of the medicines you take. How can you care for yourself at home? · Choose foods that are easy to chew and swallow. Good choices are mashed potatoes, soft breads and rolls, cream soups, oatmeal, and Cream of Wheat. · Choose soft, well-cooked vegetables and soft or canned fruits. Good choices are applesauce, ripe bananas, and non-citrus fruit juice.   · Try milk, yogurt, or other milk products, if you can digest dairy without too many problems. Your doctor may limit milk and milk products for a while. If so, he or she may recommend a calcium and vitamin D supplement. · Choose soft protein foods such as eggs, tofu, steamed fish, chicken, and turkey. Slow-cooking methods, such as stewing, will help soften meat. Chopping meat in a  or  also will make it easier to eat. · Avoid nuts, raw vegetables, hard crackers, tough meats, and prunes and prune juice. · Avoid foods that are very spicy, such as foods seasoned with black pepper, chili peppers, horseradish, or hot sauce. · Avoid highly acidic foods such as citrus fruits, citrus fruit juices, and tomato-based foods. · Avoid high-fat foods such as fried meat, chips, and rich desserts. · Check with your doctor before you drink alcohol or beverages that have caffeine, such as coffee, tea, and cola beverages. Where can you learn more? Go to http://sanjiv-kenny.info/. Enter U990 in the search box to learn more about \"Soft-Textured, Russ Plain Diet: Care Instructions. \"  Current as of: March 28, 2018  Content Version: 11.9  © 7700-0273 [employer]. Care instructions adapted under license by [employer] (which disclaims liability or warranty for this information). If you have questions about a medical condition or this instruction, always ask your healthcare professional. [place] [address] any warranty or liability for your use of this information.

## 2019-05-16 NOTE — ED NOTES
Pt arrives alert and oriented c/o abdominal pain and vomiting. Pt reports she goes to methadone clinic daily and they sent here here this am to be sure she was cleared to take her methadone per the patient.

## 2019-05-16 NOTE — ED TRIAGE NOTES
Patient reports vomiting since 5am today. Patient also reports diarrhea. Denies any urinary symptoms.

## 2019-08-14 ENCOUNTER — OFFICE VISIT (OUTPATIENT)
Dept: FAMILY MEDICINE CLINIC | Age: 64
End: 2019-08-14

## 2019-08-14 VITALS
TEMPERATURE: 98.1 F | HEIGHT: 61 IN | DIASTOLIC BLOOD PRESSURE: 78 MMHG | WEIGHT: 101 LBS | OXYGEN SATURATION: 99 % | HEART RATE: 81 BPM | RESPIRATION RATE: 14 BRPM | BODY MASS INDEX: 19.07 KG/M2 | SYSTOLIC BLOOD PRESSURE: 124 MMHG

## 2019-08-14 DIAGNOSIS — M15.9 PRIMARY OSTEOARTHRITIS INVOLVING MULTIPLE JOINTS: ICD-10-CM

## 2019-08-14 DIAGNOSIS — J43.8 OTHER EMPHYSEMA (HCC): Primary | ICD-10-CM

## 2019-08-14 RX ORDER — ALBUTEROL SULFATE 90 UG/1
2 AEROSOL, METERED RESPIRATORY (INHALATION)
Qty: 1 INHALER | Refills: 5 | Status: SHIPPED | OUTPATIENT
Start: 2019-08-14 | End: 2020-07-08 | Stop reason: SDUPTHER

## 2019-08-14 NOTE — PATIENT INSTRUCTIONS
Chronic Obstructive Pulmonary Disease (COPD): Care Instructions Your Care Instructions Chronic obstructive pulmonary disease (COPD) is a general term for a group of lung diseases, including emphysema and chronic bronchitis. People with COPD have decreased airflow in and out of the lungs, which makes it hard to breathe. The airways also can get clogged with thick mucus. Cigarette smoking is a major cause of COPD. Although there is no cure for COPD, you can slow its progress. Following your treatment plan and taking care of yourself can help you feel better and live longer. Follow-up care is a key part of your treatment and safety. Be sure to make and go to all appointments, and call your doctor if you are having problems. It's also a good idea to know your test results and keep a list of the medicines you take. How can you care for yourself at home? 
 Staying healthy 
  · Do not smoke. This is the most important step you can take to prevent more damage to your lungs. If you need help quitting, talk to your doctor about stop-smoking programs and medicines. These can increase your chances of quitting for good.  
  · Avoid colds and flu. Get a pneumococcal vaccine shot. If you have had one before, ask your doctor whether you need a second dose. Get the flu vaccine every fall. If you must be around people with colds or the flu, wash your hands often.  
  · Avoid secondhand smoke, air pollution, and high altitudes. Also avoid cold, dry air and hot, humid air. Stay at home with your windows closed when air pollution is bad.  
 Medicines and oxygen therapy 
  · Take your medicines exactly as prescribed. Call your doctor if you think you are having a problem with your medicine.  
  · You may be taking medicines such as: 
? Bronchodilators. These help open your airways and make breathing easier.  Bronchodilators are either short-acting (work for 6 to 9 hours) or long-acting (work for 24 hours). You inhale most bronchodilators, so they start to act quickly. Always carry your quick-relief inhaler with you in case you need it while you are away from home. ? Corticosteroids (prednisone, budesonide). These reduce airway inflammation. They come in pill or inhaled form. You must take these medicines every day for them to work well.  
  · A spacer may help you get more inhaled medicine to your lungs. Ask your doctor or pharmacist if a spacer is right for you. If it is, ask how to use it properly.  
  · Do not take any vitamins, over-the-counter medicine, or herbal products without talking to your doctor first.  
  · If your doctor prescribed antibiotics, take them as directed. Do not stop taking them just because you feel better. You need to take the full course of antibiotics.  
  · Oxygen therapy boosts the amount of oxygen in your blood and helps you breathe easier. Use the flow rate your doctor has recommended, and do not change it without talking to your doctor first.  
Activity 
  · Get regular exercise. Walking is an easy way to get exercise. Start out slowly, and walk a little more each day.  
  · Pay attention to your breathing. You are exercising too hard if you cannot talk while you are exercising.  
  · Take short rest breaks when doing household chores and other activities.  
  · Learn breathing methodssuch as breathing through pursed lipsto help you become less short of breath.  
  · If your doctor has not set you up with a pulmonary rehabilitation program, talk to him or her about whether rehab is right for you. Rehab includes exercise programs, education about your disease and how to manage it, help with diet and other changes, and emotional support. Diet 
  · Eat regular, healthy meals. Use bronchodilators about 1 hour before you eat to make it easier to eat.  Eat several small meals instead of three large ones. Drink beverages at the end of the meal. Avoid foods that are hard to chew.  
  · Eat foods that contain protein so that you do not lose muscle mass.  
  · Talk with your doctor if you gain too much weight or if you lose weight without trying.  
 Mental health 
  · Talk to your family, friends, or a therapist about your feelings. It is normal to feel frightened, angry, hopeless, helpless, and even guilty. Talking openly about bad feelings can help you cope. If these feelings last, talk to your doctor. When should you call for help? Call 911 anytime you think you may need emergency care. For example, call if: 
  · You have severe trouble breathing.  
 Call your doctor now or seek immediate medical care if: 
  · You have new or worse trouble breathing.  
  · You cough up blood.  
  · You have a fever.  
 Watch closely for changes in your health, and be sure to contact your doctor if: 
  · You cough more deeply or more often, especially if you notice more mucus or a change in the color of your mucus.  
  · You have new or worse swelling in your legs or belly.  
  · You are not getting better as expected. Where can you learn more? Go to http://sanjiv-kenny.info/. Gerre Listen in the search box to learn more about \"Chronic Obstructive Pulmonary Disease (COPD): Care Instructions. \" Current as of: September 5, 2018 Content Version: 12.1 © 8250-3681 [employer]. Care instructions adapted under license by [employer] (which disclaims liability or warranty for this information). If you have questions about a medical condition or this instruction, always ask your healthcare professional. [place] [address] any warranty or liability for your use of this information.

## 2019-08-14 NOTE — PROGRESS NOTES
Shonda Cobb is a 59 y.o. female  presents for establish care. She needs refills. No wheezing or SOB. No Known Allergies  Outpatient Medications Marked as Taking for the 8/14/19 encounter (Office Visit) with Shahab Palacios MD   Medication Sig Dispense Refill    methadone (DOLOPHINE) 10 mg/mL solution Take 145 mg by mouth daily. [address] [place] [address] There is no problem list on file for this patient. Past Medical History:   Diagnosis Date    Arthritis     Chronic obstructive pulmonary disease (HCC)     Chronic pain     Fibromyalgia     Ill-defined condition     raynaulds     Social History     Socioeconomic History    Marital status:      Spouse name: Not on file    Number of children: Not on file    Years of education: Not on file    Highest education level: Not on file   Tobacco Use    Smoking status: Current Every Day Smoker     Packs/day: 0.50    Smokeless tobacco: Never Used   Substance and Sexual Activity    Alcohol use: No    Drug use: No    Sexual activity: Not Currently     No family history on file. Review of Systems   Constitutional: Negative for chills, fever, malaise/fatigue and weight loss. Eyes: Negative for blurred vision. Respiratory: Negative for shortness of breath and wheezing. Cardiovascular: Negative for chest pain. Gastrointestinal: Negative for nausea and vomiting. Musculoskeletal: Negative for myalgias. Skin: Negative for rash. Neurological: Negative for weakness. Vitals:    08/14/19 1432   BP: 124/78   Pulse: 81   Resp: 14   Temp: 98.1 °F (36.7 °C)   SpO2: 99%   Weight: 101 lb (45.8 kg)   Height: 5' 1\" (1.549 m)       Physical Exam   Constitutional: She is oriented to person, place, and time and well-developed, well-nourished, and in no distress. Neck: Normal range of motion. Neck supple. No thyromegaly present. Cardiovascular: Normal rate, regular rhythm and normal heart sounds. Pulmonary/Chest: Effort normal and breath sounds normal.   Musculoskeletal: Normal range of motion. Neurological: She is alert and oriented to person, place, and time. Skin: Skin is warm and dry. Psychiatric: Mood, memory, affect and judgment normal.   Nursing note and vitals reviewed. Assessment/Plan      ICD-10-CM ICD-9-CM    1. Other emphysema ([address] J43.8 492.8 albuterol (PROVENTIL HFA, VENTOLIN HFA, PROAIR HFA) 90 mcg/actuation inhaler   2. Primary osteoarthritis involving multiple joints M15.0 715.09      I have discussed the diagnosis with the patient and the intended plan of care as seen in the above orders. The patient has received an after-visit summary and questions were answered concerning future plans. I have discussed medication, side effects, and warnings with the patient in detail. The patient verbalized understanding and is in agreement with the plan of care. The patient will contact the office with any additional concerns.     lab results and schedule of future lab studies reviewed with patient    Aida Diaz MD

## 2019-12-04 ENCOUNTER — OFFICE VISIT (OUTPATIENT)
Dept: FAMILY MEDICINE CLINIC | Age: 64
End: 2019-12-04

## 2019-12-04 VITALS
OXYGEN SATURATION: 90 % | HEIGHT: 61 IN | DIASTOLIC BLOOD PRESSURE: 84 MMHG | WEIGHT: 103.6 LBS | HEART RATE: 68 BPM | RESPIRATION RATE: 10 BRPM | TEMPERATURE: 98 F | SYSTOLIC BLOOD PRESSURE: 122 MMHG | BODY MASS INDEX: 19.56 KG/M2

## 2019-12-04 DIAGNOSIS — J40 BRONCHITIS: Primary | ICD-10-CM

## 2019-12-04 RX ORDER — HYDROCODONE POLISTIREX AND CHLORPHENIRAMINE POLISTIREX 10; 8 MG/5ML; MG/5ML
5 SUSPENSION, EXTENDED RELEASE ORAL
Qty: 115 ML | Refills: 0 | Status: SHIPPED | OUTPATIENT
Start: 2019-12-04 | End: 2019-12-11

## 2019-12-04 RX ORDER — DOXYCYCLINE 100 MG/1
100 CAPSULE ORAL 2 TIMES DAILY
Qty: 20 CAP | Refills: 0 | Status: SHIPPED | OUTPATIENT
Start: 2019-12-04 | End: 2019-12-14

## 2019-12-04 NOTE — PATIENT INSTRUCTIONS
Bronchitis: Care Instructions  Your Care Instructions    Bronchitis is inflammation of the bronchial tubes, which carry air to the lungs. The tubes swell and produce mucus, or phlegm. The mucus and inflamed bronchial tubes make you cough. You may have trouble breathing. Most cases of bronchitis are caused by viruses like those that cause colds. Antibiotics usually do not help and they may be harmful. Bronchitis usually develops rapidly and lasts about 2 to 3 weeks in otherwise healthy people. Follow-up care is a key part of your treatment and safety. Be sure to make and go to all appointments, and call your doctor if you are having problems. It's also a good idea to know your test results and keep a list of the medicines you take. How can you care for yourself at home? · Take all medicines exactly as prescribed. Call your doctor if you think you are having a problem with your medicine. · Get some extra rest.  · Take an over-the-counter pain medicine, such as acetaminophen (Tylenol), ibuprofen (Advil, Motrin), or naproxen (Aleve) to reduce fever and relieve body aches. Read and follow all instructions on the label. · Do not take two or more pain medicines at the same time unless the doctor told you to. Many pain medicines have acetaminophen, which is Tylenol. Too much acetaminophen (Tylenol) can be harmful. · Take an over-the-counter cough medicine that contains dextromethorphan to help quiet a dry, hacking cough so that you can sleep. Avoid cough medicines that have more than one active ingredient. Read and follow all instructions on the label. · Breathe moist air from a humidifier, hot shower, or sink filled with hot water. The heat and moisture will thin mucus so you can cough it out. · Do not smoke. Smoking can make bronchitis worse. If you need help quitting, talk to your doctor about stop-smoking programs and medicines. These can increase your chances of quitting for good.   When should you call for help? Call 911 anytime you think you may need emergency care. For example, call if:    · You have severe trouble breathing.    Call your doctor now or seek immediate medical care if:    · You have new or worse trouble breathing.     · You cough up dark brown or bloody mucus (sputum).     · You have a new or higher fever.     · You have a new rash.    Watch closely for changes in your health, and be sure to contact your doctor if:    · You cough more deeply or more often, especially if you notice more mucus or a change in the color of your mucus.     · You are not getting better as expected. Where can you learn more? Go to http://sanjiv-kenny.info/. Enter H333 in the search box to learn more about \"Bronchitis: Care Instructions. \"  Current as of: June 9, 2019  Content Version: 12.2  © 9610-1159 [employer]. Care instructions adapted under license by [employer] (which disclaims liability or warranty for this information). If you have questions about a medical condition or this instruction, always ask your healthcare professional. [address] any warranty or liability for your use of this information.

## 2019-12-04 NOTE — PROGRESS NOTES
Patient here for f/u on her COPD she says she has had a cough x 2 weeks. She says she had a virus. 1. Have you been to the ER, urgent care clinic since your last visit? Hospitalized since your last visit? No  2. Have you seen or consulted any other health care providers outside of the [address] [place] [address] since your last visit? Include any pap smears or colon screening. No    Medication reconciliation has been completed with patient. Care team discussed/updated as well as pharmacy.     Health Maintenance Due   Topic Date Due    Hepatitis C Screening  1955    Pneumococcal 0-64 years (1 of 1 - PPSV23) 12/12/1962    DTaP/Tdap/Td series (1 - Tdap) 12/12/1967    PAP AKA CERVICAL CYTOLOGY  12/12/1977    Shingrix Vaccine Age 50> (1 of 2) 12/12/2006    BREAST CANCER SCRN MAMMOGRAM  12/12/2006    FOBT Q 1 YEAR AGE 50-75  12/12/2006    Influenza Age 9 to Adult  08/01/2019

## 2019-12-04 NOTE — PROGRESS NOTES
Juana Lombard is a 59 y.o. female  presents for cough for about 2 weeks. She states that sxs are getting worse. It is non productive. No fever or chills. No Known Allergies  Outpatient Medications Marked as Taking for the 12/4/19 encounter (Office Visit) with Danielle Walker MD   Medication Sig Dispense Refill    albuterol (PROVENTIL HFA, VENTOLIN HFA, PROAIR HFA) 90 mcg/actuation inhaler Take 2 Puffs by inhalation every four (4) hours as needed for Wheezing. 1 Inhaler 5    ondansetron (ZOFRAN ODT) 4 mg disintegrating tablet Take 1 Tab by mouth every eight (8) hours as needed for Nausea. 12 Tab 0    methadone (DOLOPHINE) 10 mg/mL solution Take 145 mg by mouth daily. [address] [place] [address] [facility] There is no problem list on file for this patient. Past Medical History:   Diagnosis Date    Arthritis     Chronic obstructive pulmonary disease (HCC)     Chronic pain     Fibromyalgia     Ill-defined condition     cynthiaulds     Social History     Socioeconomic History    Marital status:      Spouse name: Not on file    Number of children: Not on file    Years of education: Not on file    Highest education level: Not on file   Tobacco Use    Smoking status: Current Every Day Smoker     Packs/day: 0.50    Smokeless tobacco: Never Used   Substance and Sexual Activity    Alcohol use: No    Drug use: No    Sexual activity: Not Currently     History reviewed. No pertinent family history. Review of Systems   Constitutional: Negative for chills, fever, malaise/fatigue and weight loss. HENT: Positive for congestion. Eyes: Negative for blurred vision. Respiratory: Positive for cough. Negative for shortness of breath and wheezing. Cardiovascular: Negative for chest pain. Gastrointestinal: Negative for nausea and vomiting. Musculoskeletal: Negative for myalgias. Skin: Negative for rash. Neurological: Negative for weakness.        Vitals: 12/04/19 1131   BP: 122/84   Pulse: 68   Resp: 10   Temp: 98 °F (36.7 °C)   TempSrc: Oral   SpO2: 90%   Weight: 103 lb 9.6 oz (47 kg)   Height: 5' 1\" (1.549 m)   PainSc:   0 - No pain       Physical Exam  Vitals signs and nursing note reviewed. Neck:      Musculoskeletal: Normal range of motion and neck supple. Thyroid: No thyromegaly. Cardiovascular:      Rate and Rhythm: Normal rate and regular rhythm. Heart sounds: Normal heart sounds. Pulmonary:      Effort: Pulmonary effort is normal.      Breath sounds: Normal breath sounds. Musculoskeletal: Normal range of motion. Skin:     General: Skin is warm and dry. Neurological:      Mental Status: She is alert and oriented to person, place, and time. Assessment/Plan      ICD-10-CM ICD-9-CM    1. Bronchitis J40 490 chlorpheniramine-HYDROcodone (TUSSIONEX) 10-8 mg/5 mL suspension      doxycycline (VIBRAMYCIN) 100 mg capsule     I have discussed the diagnosis with the patient and the intended plan of care as seen in the above orders. The patient has received an after-visit summary and questions were answered concerning future plans. I have discussed medication, side effects, and warnings with the patient in detail. The patient verbalized understanding and is in agreement with the plan of care. The patient will contact the office with any additional concerns.         lab results and schedule of future lab studies reviewed with patient    Martin Tomas MD

## 2020-04-13 ENCOUNTER — VIRTUAL VISIT (OUTPATIENT)
Dept: FAMILY MEDICINE CLINIC | Age: 65
End: 2020-04-13

## 2020-04-13 ENCOUNTER — PATIENT MESSAGE (OUTPATIENT)
Dept: FAMILY MEDICINE CLINIC | Age: 65
End: 2020-04-13

## 2020-04-13 ENCOUNTER — TELEPHONE (OUTPATIENT)
Dept: FAMILY MEDICINE CLINIC | Age: 65
End: 2020-04-13

## 2020-04-13 DIAGNOSIS — J40 BRONCHITIS: Primary | ICD-10-CM

## 2020-04-13 RX ORDER — DOXYCYCLINE 100 MG/1
100 CAPSULE ORAL 2 TIMES DAILY
Qty: 20 CAP | Refills: 0 | Status: SHIPPED | OUTPATIENT
Start: 2020-04-13 | End: 2020-04-23

## 2020-04-13 RX ORDER — BENZONATATE 200 MG/1
200 CAPSULE ORAL
Qty: 24 CAP | Refills: 1 | Status: SHIPPED | OUTPATIENT
Start: 2020-04-13 | End: 2020-04-20

## 2020-04-13 NOTE — TELEPHONE ENCOUNTER
I received a call from the messaging center and was connected to the patient. She was seen earlier for bronchitis. She states that she takes tussionex and this is the only thing that would control her cough. She was given tessalon perles. She would like tussionex sent in to the pharmacy. I did let her know that tussionex is a controlled medication and this would have to be prescribed by her provider. I did let her know that I would send the message to her provider about sending in a different medication. She verbalized understanding and agreement to this.   Tyshawn Agrawal MD

## 2020-04-13 NOTE — PROGRESS NOTES
Consent: Liliane Vazquez, who was seen by synchronous (real-time) audio-video technology, and/or her healthcare decision maker, is aware that this patient-initiated, Telehealth encounter on 4/13/2020 is a billable service, with coverage as determined by her insurance carrier. She is aware that she may receive a bill and has provided verbal consent to proceed: Yes. Assessment & Plan:   Diagnoses and all orders for this visit:    1. Bronchitis  -     benzonatate (TESSALON) 200 mg capsule; Take 1 Cap by mouth three (3) times daily as needed for Cough for up to 7 days. -     doxycycline (VIBRAMYCIN) 100 mg capsule; Take 1 Cap by mouth two (2) times a day for 10 days. 712  Subjective:   Liliane Vazquez is a 62 y.o. female who was seen for No chief complaint on file. Prior to Admission medications    Medication Sig Start Date End Date Taking? Authorizing Provider   benzonatate (TESSALON) 200 mg capsule Take 1 Cap by mouth three (3) times daily as needed for Cough for up to 7 days. 4/13/20 4/20/20 Yes Nora Keita MD   doxycycline (VIBRAMYCIN) 100 mg capsule Take 1 Cap by mouth two (2) times a day for 10 days. 4/13/20 4/23/20 Yes Nora Keita MD   albuterol (PROVENTIL HFA, VENTOLIN HFA, PROAIR HFA) 90 mcg/actuation inhaler Take 2 Puffs by inhalation every four (4) hours as needed for Wheezing. 8/14/19   Nora Keita MD   ondansetron (ZOFRAN ODT) 4 mg disintegrating tablet Take 1 Tab by mouth every eight (8) hours as needed for Nausea. 5/16/19   Jayjay Minor DO   methadone (DOLOPHINE) 10 mg/mL solution Take 145 mg by mouth daily. [address] [facility] [address] [facility] Provider, Historical     No Known Allergies    No Known Allergies    Review of Systems   Constitutional: Negative for chills, fever, malaise/fatigue and weight loss. HENT: Positive for congestion. Negative for sore throat. Eyes: Negative for blurred vision.    Respiratory: Positive for cough. Negative for hemoptysis, sputum production, shortness of breath and wheezing. Cardiovascular: Negative for chest pain. Gastrointestinal: Negative for nausea and vomiting. Musculoskeletal: Negative for myalgias. Skin: Negative for rash. Neurological: Negative for weakness. Objective: There were no vitals taken for this visit. General: alert, cooperative, no distress   Mental  status: normal mood, behavior, speech, dress, motor activity, and thought processes, able to follow commands   HENT: NCAT   Neck: no visualized mass   Resp: no respiratory distress   Neuro: no gross deficits   Skin: no discoloration or lesions of concern on visible areas   Psychiatric: normal affect, consistent with stated mood, no evidence of hallucinations     Additional exam findings: We discussed the expected course, resolution and complications of the diagnosis(es) in detail. Medication risks, benefits, costs, interactions, and alternatives were discussed as indicated. I advised her to contact the office if her condition worsens, changes or fails to improve as anticipated. She expressed understanding with the diagnosis(es) and plan. James Brooks is a 62 y.o. female being evaluated by a video visit encounter for concerns as above. A caregiver was present when appropriate. Due to this being a TeleHealth encounter (During [facility] public health emergency), evaluation of the following organ systems was limited: Vitals/Constitutional/EENT/Resp/CV/GI//MS/Neuro/Skin/Heme-Lymph-Imm. Pursuant to the emergency declaration under the [facility], [facility] waiver authority and the [employer] Act, this Virtual  Visit was conducted, with patient's (and/or legal guardian's) consent, to reduce the patient's risk of exposure to COVID-19 and provide necessary medical care.      Services were provided through a video synchronous discussion virtually via doxy. me to substitute for in-person clinic visit. Patient and provider were located at their individual homes.         Eliel Rutherford MD

## 2020-04-18 ENCOUNTER — TELEPHONE (OUTPATIENT)
Dept: FAMILY MEDICINE CLINIC | Age: 65
End: 2020-04-18

## 2020-04-18 NOTE — TELEPHONE ENCOUNTER
Call received via the answering service. Patient reports that she had a telemedicine visit with her primary earlier this week. She has a history of COPD. She has a productive cough currently and was prescribed Tessalon Perles. She reports that this is not helping. She was expecting a prescription for Tussionex and has made calls to the office to follow-up on this. Patient insists that she is not able to sleep due to her cough. Patient advised that I would review her record but that we typically do not call in controlled substance prescriptions after hours. Patient also advised that I would send her a message via [employer] to let her know when I have reviewed her record and what my decision is about the prescription. Upon review, I see that patient has been on methadone in the past.  I am not certain if this is an ongoing treatment. Based on this history, I will not prescribe a controlled substance prescription for her. Will recommend that patient follow-up with her primary on 4/20/2020.

## 2020-04-22 DIAGNOSIS — J40 BRONCHITIS: Primary | ICD-10-CM

## 2020-04-22 RX ORDER — HYDROCODONE POLISTIREX AND CHLORPHENIRAMINE POLISTIREX 10; 8 MG/5ML; MG/5ML
5 SUSPENSION, EXTENDED RELEASE ORAL
Qty: 115 ML | Refills: 0 | Status: SHIPPED | OUTPATIENT
Start: 2020-04-22 | End: 2020-04-25

## 2020-06-23 ENCOUNTER — HOSPITAL ENCOUNTER (OUTPATIENT)
Dept: NON INVASIVE DIAGNOSTICS | Age: 65
Discharge: HOME | End: 2020-06-23
Payer: COMMERCIAL

## 2020-06-23 LAB
ATRIAL RATE: 73 BPM
CALCULATED P AXIS, ECG09: 77 DEGREES
CALCULATED R AXIS, ECG10: -49 DEGREES
CALCULATED T AXIS, ECG11: 65 DEGREES
DIAGNOSIS, 93000: NORMAL
P-R INTERVAL, ECG05: 156 MS
Q-T INTERVAL, ECG07: 406 MS
QRS DURATION, ECG06: 90 MS
QTC CALCULATION (BEZET), ECG08: 447 MS
VENTRICULAR RATE, ECG03: 73 BPM

## 2020-06-23 PROCEDURE — 93005 ELECTROCARDIOGRAM TRACING: CPT

## 2020-07-08 DIAGNOSIS — J43.8 OTHER EMPHYSEMA (HCC): ICD-10-CM

## 2020-07-08 RX ORDER — ALBUTEROL SULFATE 90 UG/1
2 AEROSOL, METERED RESPIRATORY (INHALATION)
Qty: 1 INHALER | Refills: 5 | Status: SHIPPED | OUTPATIENT
Start: 2020-07-08 | End: 2022-09-29 | Stop reason: SDUPTHER

## 2020-07-08 NOTE — TELEPHONE ENCOUNTER
Patient is asking for refills on her Albuterol inhaler she was last seen via VV for Bronchitis on 4/13/2020.

## 2020-07-13 ENCOUNTER — TELEPHONE (OUTPATIENT)
Dept: FAMILY MEDICINE CLINIC | Age: 65
End: 2020-07-13

## 2020-07-13 RX ORDER — FLUTICASONE PROPIONATE AND SALMETEROL 250; 50 UG/1; UG/1
1 POWDER RESPIRATORY (INHALATION) EVERY 12 HOURS
Qty: 1 INHALER | Refills: 2 | Status: SHIPPED | OUTPATIENT
Start: 2020-07-13 | End: 2020-10-02

## 2020-07-13 NOTE — TELEPHONE ENCOUNTER
Fax received from [address] [place] [address] meds stating prior [employer] is required for the Patton Gum 250-50mcg. Submit a PA request  1. Go to key. [employer] and click \"Enter a Key\"  2. Patient last name: Jasmine Montano      : 57      Key: [id number]  3.  Click \"start a PA\", complete the form, and \"send to plan\"

## 2020-10-02 RX ORDER — FLUTICASONE PROPIONATE AND SALMETEROL 50; 250 UG/1; UG/1
POWDER RESPIRATORY (INHALATION)
Qty: 1 INHALER | Refills: 4 | Status: SHIPPED | OUTPATIENT
Start: 2020-10-02 | End: 2022-09-22

## 2020-10-06 RX ORDER — FLUTICASONE PROPIONATE AND SALMETEROL 250; 50 UG/1; UG/1
1 POWDER RESPIRATORY (INHALATION) EVERY 12 HOURS
Qty: 1 INHALER | Refills: 2 | Status: SHIPPED | OUTPATIENT
Start: 2020-10-06 | End: 2022-09-22

## 2020-12-28 ENCOUNTER — PATIENT MESSAGE (OUTPATIENT)
Dept: FAMILY MEDICINE CLINIC | Age: 65
End: 2020-12-28

## 2020-12-30 DIAGNOSIS — J40 BRONCHITIS: Primary | ICD-10-CM

## 2020-12-30 RX ORDER — HYDROCODONE POLISTIREX AND CHLORPHENIRAMINE POLISTIREX 10; 8 MG/5ML; MG/5ML
5 SUSPENSION, EXTENDED RELEASE ORAL
Qty: 118 ML | Refills: 0 | Status: SHIPPED | OUTPATIENT
Start: 2020-12-30 | End: 2021-01-02

## 2021-02-03 DIAGNOSIS — Z12.31 ENCOUNTER FOR SCREENING MAMMOGRAM FOR MALIGNANT NEOPLASM OF BREAST: Primary | ICD-10-CM

## 2021-02-03 DIAGNOSIS — Z12.31 ENCOUNTER FOR SCREENING MAMMOGRAM FOR MALIGNANT NEOPLASM OF BREAST: ICD-10-CM

## 2021-05-03 ENCOUNTER — TRANSCRIBE ORDER (OUTPATIENT)
Dept: REGISTRATION | Age: 66
End: 2021-05-03

## 2021-05-03 ENCOUNTER — HOSPITAL ENCOUNTER (OUTPATIENT)
Dept: NON INVASIVE DIAGNOSTICS | Age: 66
Discharge: HOME | End: 2021-05-03

## 2021-05-03 DIAGNOSIS — R94.31 NONSPECIFIC ABNORMAL ELECTROCARDIOGRAM (ECG) (EKG): ICD-10-CM

## 2021-05-03 DIAGNOSIS — R94.31 NONSPECIFIC ABNORMAL ELECTROCARDIOGRAM (ECG) (EKG): Primary | ICD-10-CM

## 2021-05-03 PROCEDURE — 93005 ELECTROCARDIOGRAM TRACING: CPT

## 2021-05-04 LAB
ATRIAL RATE: 92 BPM
CALCULATED P AXIS, ECG09: 81 DEGREES
CALCULATED R AXIS, ECG10: -75 DEGREES
CALCULATED T AXIS, ECG11: 64 DEGREES
DIAGNOSIS, 93000: NORMAL
P-R INTERVAL, ECG05: 154 MS
Q-T INTERVAL, ECG07: 350 MS
QRS DURATION, ECG06: 86 MS
QTC CALCULATION (BEZET), ECG08: 432 MS
VENTRICULAR RATE, ECG03: 92 BPM

## 2021-09-08 ENCOUNTER — VIRTUAL VISIT (OUTPATIENT)
Dept: FAMILY MEDICINE CLINIC | Age: 66
End: 2021-09-08

## 2021-09-08 DIAGNOSIS — J40 BRONCHITIS: Primary | ICD-10-CM

## 2021-09-08 PROCEDURE — 99213 OFFICE O/P EST LOW 20 MIN: CPT | Performed by: FAMILY MEDICINE

## 2021-09-08 RX ORDER — PREDNISONE 10 MG/1
TABLET ORAL
Qty: 21 TABLET | Refills: 0 | Status: SHIPPED | OUTPATIENT
Start: 2021-09-08 | End: 2022-09-29 | Stop reason: ALTCHOICE

## 2021-09-08 RX ORDER — DOXYCYCLINE 100 MG/1
100 CAPSULE ORAL 2 TIMES DAILY
Qty: 20 CAPSULE | Refills: 0 | Status: SHIPPED | OUTPATIENT
Start: 2021-09-08 | End: 2021-09-18

## 2021-09-08 NOTE — PROGRESS NOTES
Mark Swartz is a 60 y.o. female who was seen by synchronous (real-time) audio-video technology on 9/8/2021 for Cough (she has had cough for a couple of weeks. no assoc fever or chills. she has loss of sense of taste. she had neg covid test 3 days ago. )        Assessment & Plan:   Diagnoses and all orders for this visit:    1. Bronchitis  -     predniSONE (STERAPRED DS) 10 mg dose pack; See administration instruction per 10mg dose pack  -     doxycycline (VIBRAMYCIN) 100 mg capsule; Take 1 Capsule by mouth two (2) times a day for 10 days. 712  Subjective:       Prior to Admission medications    Medication Sig Start Date End Date Taking? Authorizing Provider   predniSONE (STERAPRED DS) 10 mg dose pack See administration instruction per 10mg dose pack 9/8/21  Yes Gurdeep Bowman MD   doxycycline (VIBRAMYCIN) 100 mg capsule Take 1 Capsule by mouth two (2) times a day for 10 days. 9/8/21 9/18/21 Yes Gurdeep Bowman MD   fluticasone propion-salmeteroL (ADVAIR/WIXELA) 250-50 mcg/dose diskus inhaler Take 1 Puff by inhalation every twelve (12) hours. 10/6/20  Yes Gurdeep Bowman MD   Advair Diskus 250-50 mcg/dose diskus inhaler TAKE 1 PUFF BY INHALATION EVERY TWELVE (12) HOURS 10/2/20  Yes Gurdeep Bowman MD   albuterol (PROVENTIL HFA, VENTOLIN HFA, PROAIR HFA) 90 mcg/actuation inhaler Take 2 Puffs by inhalation every four (4) hours as needed for Wheezing. 7/8/20  Yes Gurdeep Bowman MD   ondansetron (ZOFRAN ODT) 4 mg disintegrating tablet Take 1 Tab by mouth every eight (8) hours as needed for Nausea. 5/16/19  Yes Yogesh Minor,    methadone (DOLOPHINE) 10 mg/mL solution Take 145 mg by mouth daily. [facility] [address] Yes Provider, Historical     There is no problem list on file for this patient.     Past Medical History:   Diagnosis Date    Arthritis     Chronic obstructive pulmonary disease (HCC)     Chronic pain     Fibromyalgia     Ill-defined condition     geneva       Review of Systems   Constitutional: Negative for chills, fever, malaise/fatigue and weight loss. Eyes: Negative for blurred vision. Respiratory: Positive for cough. Negative for shortness of breath and wheezing. Cardiovascular: Negative for chest pain. Gastrointestinal: Negative for nausea and vomiting. Musculoskeletal: Negative for myalgias. Skin: Negative for rash. Neurological: Negative for weakness. Objective:   No flowsheet data found. General: alert, cooperative, no distress   Mental  status: normal mood, behavior, speech, dress, motor activity, and thought processes, able to follow commands   HENT: NCAT   Neck: no visualized mass   Resp: no respiratory distress   Neuro: no gross deficits   Skin: no discoloration or lesions of concern on visible areas   Psychiatric: normal affect, consistent with stated mood, no evidence of hallucinations     Additional exam findings: We discussed the expected course, resolution and complications of the diagnosis(es) in detail. Medication risks, benefits, costs, interactions, and alternatives were discussed as indicated. I advised her to contact the office if her condition worsens, changes or fails to improve as anticipated. She expressed understanding with the diagnosis(es) and plan. Tasha Burch, was evaluated through a synchronous (real-time) audio-video encounter. The patient (or guardian if applicable) is aware that this is a billable service. Verbal consent to proceed has been obtained within the past 12 months. The visit was conducted pursuant to the emergency declaration under the [address] [place] [address], [address] [place] [address] authority and the [employer] and [employer] General Act. Patient identification was verified, and a caregiver was present when appropriate.  The patient was located in a state where the provider was credentialed to provide care.    Suha Shell MD

## 2021-11-29 ENCOUNTER — PATIENT MESSAGE (OUTPATIENT)
Dept: FAMILY MEDICINE CLINIC | Age: 66
End: 2021-11-29

## 2021-11-30 RX ORDER — FLUTICASONE FUROATE, UMECLIDINIUM BROMIDE AND VILANTEROL TRIFENATATE 200; 62.5; 25 UG/1; UG/1; UG/1
1 POWDER RESPIRATORY (INHALATION) DAILY
Qty: 1 BLISTER | Refills: 2 | Status: SHIPPED | OUTPATIENT
Start: 2021-11-30 | End: 2022-05-09 | Stop reason: SDUPTHER

## 2021-11-30 NOTE — TELEPHONE ENCOUNTER
Pt was last seen on a VV on 9/08/2021 for Bronchitis. I do not see a recommended follow up. She is requesting Trelegy inhaler. She states she was prescribed this before by an ER dr and it helped. Will she need a new appt? Please advise. I have pended the inhaler for review.

## 2021-12-01 ENCOUNTER — PATIENT MESSAGE (OUTPATIENT)
Dept: MAMMOGRAPHY | Age: 66
End: 2021-12-01

## 2022-02-08 DIAGNOSIS — J40 BRONCHITIS: Primary | ICD-10-CM

## 2022-02-08 RX ORDER — HYDROCODONE POLISTIREX AND CHLORPHENIRAMINE POLISTIREX 10; 8 MG/5ML; MG/5ML
5 SUSPENSION, EXTENDED RELEASE ORAL
Qty: 115 ML | Refills: 0 | Status: SHIPPED | OUTPATIENT
Start: 2022-02-08 | End: 2022-02-22

## 2022-02-08 NOTE — TELEPHONE ENCOUNTER
See msg from pt below. Medicine pended. Good Afternoon Dr. Paula Cosby! How are you? 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 Rafaela Sexton Can you please call in Tussinex, because that is the only thing that will work? Thank You so much!

## 2022-05-10 RX ORDER — FLUTICASONE FUROATE, UMECLIDINIUM BROMIDE AND VILANTEROL TRIFENATATE 200; 62.5; 25 UG/1; UG/1; UG/1
1 POWDER RESPIRATORY (INHALATION) DAILY
Qty: 1 BLISTER | Refills: 2 | Status: SHIPPED | OUTPATIENT
Start: 2022-05-10 | End: 2022-09-08

## 2022-05-18 RX ORDER — ONDANSETRON 4 MG/1
4 TABLET, ORALLY DISINTEGRATING ORAL
Qty: 12 TABLET | Refills: 0 | Status: SHIPPED | OUTPATIENT
Start: 2022-05-18 | End: 2022-09-29 | Stop reason: SDUPTHER

## 2022-06-02 ENCOUNTER — TELEPHONE (OUTPATIENT)
Dept: MAMMOGRAPHY | Age: 67
End: 2022-06-02

## 2022-06-02 ENCOUNTER — PATIENT MESSAGE (OUTPATIENT)
Dept: MAMMOGRAPHY | Age: 67
End: 2022-06-02

## 2022-06-20 ENCOUNTER — OFFICE VISIT (OUTPATIENT)
Dept: FAMILY MEDICINE CLINIC | Age: 67
End: 2022-06-20
Payer: MEDICARE

## 2022-06-20 VITALS
HEART RATE: 92 BPM | DIASTOLIC BLOOD PRESSURE: 64 MMHG | SYSTOLIC BLOOD PRESSURE: 98 MMHG | WEIGHT: 119 LBS | OXYGEN SATURATION: 95 % | BODY MASS INDEX: 22.48 KG/M2 | TEMPERATURE: 97.5 F

## 2022-06-20 DIAGNOSIS — R63.5 WEIGHT GAIN: Primary | ICD-10-CM

## 2022-06-20 DIAGNOSIS — Z12.11 COLON CANCER SCREENING: ICD-10-CM

## 2022-06-20 DIAGNOSIS — J43.8 OTHER EMPHYSEMA (HCC): ICD-10-CM

## 2022-06-20 DIAGNOSIS — Z12.31 ENCOUNTER FOR SCREENING MAMMOGRAM FOR MALIGNANT NEOPLASM OF BREAST: ICD-10-CM

## 2022-06-20 PROCEDURE — 3017F COLORECTAL CA SCREEN DOC REV: CPT | Performed by: FAMILY MEDICINE

## 2022-06-20 PROCEDURE — G8536 NO DOC ELDER MAL SCRN: HCPCS | Performed by: FAMILY MEDICINE

## 2022-06-20 PROCEDURE — G8400 PT W/DXA NO RESULTS DOC: HCPCS | Performed by: FAMILY MEDICINE

## 2022-06-20 PROCEDURE — G8420 CALC BMI NORM PARAMETERS: HCPCS | Performed by: FAMILY MEDICINE

## 2022-06-20 PROCEDURE — 99213 OFFICE O/P EST LOW 20 MIN: CPT | Performed by: FAMILY MEDICINE

## 2022-06-20 PROCEDURE — G9899 SCRN MAM PERF RSLTS DOC: HCPCS | Performed by: FAMILY MEDICINE

## 2022-06-20 PROCEDURE — 1123F ACP DISCUSS/DSCN MKR DOCD: CPT | Performed by: FAMILY MEDICINE

## 2022-06-20 PROCEDURE — 1090F PRES/ABSN URINE INCON ASSESS: CPT | Performed by: FAMILY MEDICINE

## 2022-06-20 PROCEDURE — 1101F PT FALLS ASSESS-DOCD LE1/YR: CPT | Performed by: FAMILY MEDICINE

## 2022-06-20 PROCEDURE — G8427 DOCREV CUR MEDS BY ELIG CLIN: HCPCS | Performed by: FAMILY MEDICINE

## 2022-06-20 PROCEDURE — G8510 SCR DEP NEG, NO PLAN REQD: HCPCS | Performed by: FAMILY MEDICINE

## 2022-06-20 NOTE — PROGRESS NOTES
Pt here to discuss weight gain. Pt states her appetite has decreased. 1. \"Have you been to the ER, urgent care clinic since your last visit? Hospitalized since your last visit? \" No    2. \"Have you seen or consulted any other health care providers outside of the [address] [place] [address] since your last visit? \" No     3. For patients aged 39-70: Has the patient had a colonoscopy / FIT/ Cologuard? No      If the patient is female:    4. For patients aged 41-77: Has the patient had a mammogram within the past 2 years? No, Pt requesting new order to have mammo done      5. For patients aged 21-65: Has the patient had a pap smear?  No

## 2022-06-20 NOTE — PROGRESS NOTES
Maurizio Vigil is a 73 y.o. female  presents for weight gain. She has no chest pain or SOB. No weakness or numbness. No SOB. No Known Allergies  Outpatient Medications Marked as Taking for the 6/20/22 encounter (Office Visit) with Jean Moreno MD   Medication Sig Dispense Refill    ondansetron (Zofran ODT) 4 mg disintegrating tablet Take 1 Tablet by mouth every eight (8) hours as needed for Nausea. 12 Tablet 0    fluticasone-umeclidin-vilanter (Trelegy Ellipta) 200-62.5-25 mcg inhaler Take 1 Puff by inhalation daily. 1 Blister 2     Patient Active Problem List   Diagnosis Code    Other emphysema ([facility] J43.8     Past Medical History:   Diagnosis Date    Arthritis     [facility]     Chronic pain     Fibromyalgia     Ill-defined condition     raynaulds     Social History     Socioeconomic History    Marital status:    Tobacco Use    Smoking status: Current Every Day Smoker     Packs/day: 0.50    Smokeless tobacco: Never Used   Substance and Sexual Activity    Alcohol use: No    Drug use: No    Sexual activity: Not Currently     No family history on file. Review of Systems   Constitutional: Negative for chills, fever, malaise/fatigue and weight loss. Eyes: Negative for blurred vision. Respiratory: Negative for cough, shortness of breath and wheezing. Cardiovascular: Negative for chest pain. Gastrointestinal: Negative for nausea and vomiting. Musculoskeletal: Negative for myalgias. Skin: Negative for rash. Neurological: Negative for weakness. Vitals:    06/20/22 0903 06/20/22 0904   BP: (!) 96/58 98/64   Pulse: 92    Temp: 97.5 °F (36.4 °C)    TempSrc: Tympanic    SpO2: 95%    Weight: 119 lb (54 kg)    PainSc:   0 - No pain        Physical Exam  Vitals and nursing note reviewed. Constitutional:       Appearance: Normal appearance. She is normal weight. Neck:      Thyroid: No thyromegaly.    Cardiovascular:      Rate and Rhythm: Normal rate and regular rhythm. Heart sounds: Normal heart sounds. Pulmonary:      Effort: Pulmonary effort is normal.      Breath sounds: Normal breath sounds. Musculoskeletal:         General: Normal range of motion. Cervical back: Normal range of motion and neck supple. Skin:     General: Skin is warm and dry. Capillary Refill: Capillary refill takes less than 2 seconds. Neurological:      General: No focal deficit present. Mental Status: She is alert and oriented to person, place, and time. Psychiatric:         Mood and Affect: Mood normal.         Behavior: Behavior normal.         Thought Content: Thought content normal.         Judgment: Judgment normal.         Assessment/Plan      ICD-10-CM ICD-9-CM    1. Weight gain  R63.5 783.1 TSH AND FREE T4      METABOLIC PANEL, COMPREHENSIVE      CBC WITH AUTOMATED DIFF   2. Encounter for screening mammogram for malignant neoplasm of breast  Z12.31 V76.12 CYNTHIA MAMMO BI SCREENING INCL CAD   3. Other emphysema ([facility] [address]  J43.8 492.8    4. Colon cancer screening  Z12.11 V76.51 OCCULT BLOOD IMMUNOASSAY,DIAGNOSTIC      I have discussed the diagnosis with the patient and the intended plan of care as seen in the above orders. The patient has received an after-visit summary and questions were answered concerning future plans. I have discussed medication, side effects, and warnings with the patient in detail. The patient verbalized understanding and is in agreement with the plan of care. The patient will contact the office with any additional concerns.       lab results and schedule of future lab studies reviewed with patient    Mercedes Jones MD

## 2022-06-20 NOTE — PATIENT INSTRUCTIONS
Colon Cancer Screening: Care Instructions  Overview     Colorectal cancer occurs in the colon or rectum. That's the lower part of your digestive system. It often starts in small growths called polyps in the colon or rectum. Polyps are usually found with screening tests. Depending on the type of test, any polyps found may be removed during the tests. Colorectal cancer usually does not cause symptoms at first. But regular tests can help find it early, before it spreads and becomes harder to treat. Your risk for colorectal cancer gets higher as you get older. Experts recommend starting screening at age 39 for people who are at average risk. Talk with your doctor about your risk and when to start and stop screening. You may have one of several tests. Follow-up care is a key part of your treatment and safety. Be sure to make and go to all appointments, and call your doctor if you are having problems. It's also a good idea to know your test results and keep a list of the medicines you take. What are the main screening tests for colon cancer? The screening tests are:  Stool tests. These include the guaiac fecal occult blood test (gFOBT), the fecal immunochemical test (FIT), and the combined fecal immunochemical test and stool DNA test (FIT-DNA). These tests check stool samples for signs of cancer. If your test is positive, you will need to have a colonoscopy. Sigmoidoscopy. This test lets your doctor look at the lining of your rectum and the lowest part of your colon. Your doctor uses a lighted tube called a sigmoidoscope. This test can't find cancers or polyps in the upper part of your colon. In some cases, polyps that are found can be removed. But if your doctor finds polyps, you will need to have a colonoscopy to check the upper part of your colon. Colonoscopy. This test lets your doctor look at the lining of your rectum and your entire colon. The doctor uses a thin, flexible tool called a colonoscope.  It can also be used to remove polyps or get a tissue sample (biopsy). A less common test is CT colonography (CTC). It's also called virtual colonoscopy. Who should be screened for colorectal cancer? Your risk for colorectal cancer gets higher as you get older. Experts recommend starting screening at age 39 for people who are at average risk. Talk with your doctor about your risk and when to start and stop screening. How often you need screening depends on the type of test you get:  Stool tests. Every year for FIT or gFOBT. Every 1 to 3 years for sDNA, also called FIT-DNA. Tests that look inside the colon. Every 5 years for sigmoidoscopy. (If you do the FIT test every year, you can get this test every 10 years.)  Every 5 years for CT colonography (virtual colonoscopy). Every 10 years for colonoscopy. Experts agree that people at higher risk may need to be tested sooner and more often. This includes people who have a strong family history of colon cancer. Talk to your doctor about which test is best for you and when to be tested. When should you call for help? Watch closely for changes in your health, and be sure to contact your doctor if:    · You have any changes in your bowel habits.     · You have any problems. Where can you learn more? Go to http://www.gray.com/  Enter M541 in the search box to learn more about \"Colon Cancer Screening: Care Instructions. \"  Current as of: September 8, 2021               Content Version: 13.2  © 8978-1029 [employer]. Care instructions adapted under license by [employer] (which disclaims liability or warranty for this information). If you have questions about a medical condition or this instruction, always ask your healthcare professional. [place] [address] any warranty or liability for your use of this information.

## 2022-06-29 ENCOUNTER — HOSPITAL ENCOUNTER (OUTPATIENT)
Dept: MAMMOGRAPHY | Age: 67
Discharge: HOME | End: 2022-06-29
Attending: FAMILY MEDICINE
Payer: MEDICARE

## 2022-06-29 DIAGNOSIS — Z12.31 ENCOUNTER FOR SCREENING MAMMOGRAM FOR MALIGNANT NEOPLASM OF BREAST: ICD-10-CM

## 2022-06-29 PROCEDURE — 77063 BREAST TOMOSYNTHESIS BI: CPT

## 2022-07-14 LAB
ALBUMIN SERPL-MCNC: 4.7 G/DL (ref 3.8–4.8)
ALBUMIN/GLOB SERPL: 1.8 {RATIO} (ref 1.2–2.2)
ALP SERPL-CCNC: 109 IU/L (ref 44–121)
ALT SERPL-CCNC: 12 IU/L (ref 0–32)
AST SERPL-CCNC: 21 IU/L (ref 0–40)
BASOPHILS # BLD AUTO: 0.1 X10E3/UL (ref 0–0.2)
BASOPHILS NFR BLD AUTO: 1 %
BILIRUB SERPL-MCNC: <0.2 MG/DL (ref 0–1.2)
BUN SERPL-MCNC: 13 MG/DL (ref 8–27)
BUN/CREAT SERPL: 16 (ref 12–28)
CALCIUM SERPL-MCNC: 9.5 MG/DL (ref 8.7–10.3)
CHLORIDE SERPL-SCNC: 102 MMOL/L (ref 96–106)
CO2 SERPL-SCNC: 26 MMOL/L (ref 20–29)
CREAT SERPL-MCNC: 0.81 MG/DL (ref 0.57–1)
EGFR: 81 ML/MIN/1.73
EOSINOPHIL # BLD AUTO: 0.4 X10E3/UL (ref 0–0.4)
EOSINOPHIL NFR BLD AUTO: 5 %
ERYTHROCYTE [DISTWIDTH] IN BLOOD BY AUTOMATED COUNT: 13.1 % (ref 11.7–15.4)
GLOBULIN SER CALC-MCNC: 2.6 G/DL (ref 1.5–4.5)
GLUCOSE SERPL-MCNC: 90 MG/DL (ref 65–99)
HCT VFR BLD AUTO: 47.5 % (ref 34–46.6)
HEMOCCULT STL QL IA: NEGATIVE
HGB BLD-MCNC: 15.6 G/DL (ref 11.1–15.9)
IMM GRANULOCYTES # BLD AUTO: 0 X10E3/UL (ref 0–0.1)
IMM GRANULOCYTES NFR BLD AUTO: 0 %
LYMPHOCYTES # BLD AUTO: 2.7 X10E3/UL (ref 0.7–3.1)
LYMPHOCYTES NFR BLD AUTO: 35 %
MCH RBC QN AUTO: 30.2 PG (ref 26.6–33)
MCHC RBC AUTO-ENTMCNC: 32.8 G/DL (ref 31.5–35.7)
MCV RBC AUTO: 92 FL (ref 79–97)
MONOCYTES # BLD AUTO: 0.6 X10E3/UL (ref 0.1–0.9)
MONOCYTES NFR BLD AUTO: 7 %
NEUTROPHILS # BLD AUTO: 4 X10E3/UL (ref 1.4–7)
NEUTROPHILS NFR BLD AUTO: 52 %
PLATELET # BLD AUTO: 304 X10E3/UL (ref 150–450)
POTASSIUM SERPL-SCNC: 4.4 MMOL/L (ref 3.5–5.2)
PROT SERPL-MCNC: 7.3 G/DL (ref 6–8.5)
RBC # BLD AUTO: 5.16 X10E6/UL (ref 3.77–5.28)
SODIUM SERPL-SCNC: 142 MMOL/L (ref 134–144)
SPECIMEN STATUS REPORT, ROLRST: NORMAL
T4 FREE SERPL DIALY-MCNC: 1.6 NG/DL
TSH SERPL-ACNC: 0.73 UU/ML
WBC # BLD AUTO: 7.8 X10E3/UL (ref 3.4–10.8)

## 2022-09-01 ENCOUNTER — TELEPHONE (OUTPATIENT)
Dept: FAMILY MEDICINE CLINIC | Age: 67
End: 2022-09-01

## 2022-09-01 NOTE — TELEPHONE ENCOUNTER
Patient called requesting Spiriva Respimat inhaler, instead of fluticasone-umeclidin-vilanter (Trelegy Ellipta) 200-62.5-25 mcg inhaler, that was prescribed in the past. She states that the Trelegy has cause her to gain weight. Please review and advise, she can be reached at [phone].

## 2022-09-07 NOTE — TELEPHONE ENCOUNTER
Patient called regarding her message on 09/01/22 . Please review and advise as soon as possible patient is out of this medication.

## 2022-09-08 DIAGNOSIS — J43.8 OTHER EMPHYSEMA (HCC): Primary | ICD-10-CM

## 2022-09-14 ENCOUNTER — TRANSCRIBE ORDER (OUTPATIENT)
Dept: REGISTRATION | Age: 67
End: 2022-09-14

## 2022-09-14 ENCOUNTER — HOSPITAL ENCOUNTER (OUTPATIENT)
Dept: NON INVASIVE DIAGNOSTICS | Age: 67
Discharge: HOME | End: 2022-09-14
Payer: MEDICARE

## 2022-09-14 DIAGNOSIS — R94.31 NONSPECIFIC ABNORMAL ELECTROCARDIOGRAM (ECG) (EKG): Primary | ICD-10-CM

## 2022-09-14 DIAGNOSIS — R94.31 NONSPECIFIC ABNORMAL ELECTROCARDIOGRAM (ECG) (EKG): ICD-10-CM

## 2022-09-14 PROCEDURE — 93005 ELECTROCARDIOGRAM TRACING: CPT

## 2022-09-15 LAB
ATRIAL RATE: 77 BPM
CALCULATED P AXIS, ECG09: 79 DEGREES
CALCULATED R AXIS, ECG10: -17 DEGREES
CALCULATED T AXIS, ECG11: 76 DEGREES
DIAGNOSIS, 93000: NORMAL
P-R INTERVAL, ECG05: 176 MS
Q-T INTERVAL, ECG07: 414 MS
QRS DURATION, ECG06: 90 MS
QTC CALCULATION (BEZET), ECG08: 468 MS
VENTRICULAR RATE, ECG03: 77 BPM

## 2022-09-21 ENCOUNTER — PATIENT MESSAGE (OUTPATIENT)
Dept: FAMILY MEDICINE CLINIC | Age: 67
End: 2022-09-21

## 2022-09-21 DIAGNOSIS — J40 BRONCHITIS: Primary | ICD-10-CM

## 2022-09-22 RX ORDER — FLUTICASONE PROPIONATE AND SALMETEROL 250; 50 UG/1; UG/1
1 POWDER RESPIRATORY (INHALATION) EVERY 12 HOURS
Qty: 1 EACH | Refills: 4 | Status: SHIPPED | OUTPATIENT
Start: 2022-09-22

## 2022-09-29 ENCOUNTER — OFFICE VISIT (OUTPATIENT)
Dept: FAMILY MEDICINE CLINIC | Age: 67
End: 2022-09-29
Payer: MEDICARE

## 2022-09-29 VITALS
BODY MASS INDEX: 22.48 KG/M2 | SYSTOLIC BLOOD PRESSURE: 116 MMHG | HEART RATE: 73 BPM | DIASTOLIC BLOOD PRESSURE: 74 MMHG | TEMPERATURE: 97.8 F | OXYGEN SATURATION: 93 % | WEIGHT: 119 LBS

## 2022-09-29 DIAGNOSIS — R11.0 NAUSEA: ICD-10-CM

## 2022-09-29 DIAGNOSIS — Z00.00 WELCOME TO MEDICARE PREVENTIVE VISIT: ICD-10-CM

## 2022-09-29 DIAGNOSIS — S22.32XD CLOSED FRACTURE OF ONE RIB OF LEFT SIDE WITH ROUTINE HEALING, SUBSEQUENT ENCOUNTER: Primary | ICD-10-CM

## 2022-09-29 DIAGNOSIS — J43.8 OTHER EMPHYSEMA (HCC): ICD-10-CM

## 2022-09-29 PROCEDURE — G8427 DOCREV CUR MEDS BY ELIG CLIN: HCPCS | Performed by: FAMILY MEDICINE

## 2022-09-29 PROCEDURE — 1101F PT FALLS ASSESS-DOCD LE1/YR: CPT | Performed by: FAMILY MEDICINE

## 2022-09-29 PROCEDURE — 1123F ACP DISCUSS/DSCN MKR DOCD: CPT | Performed by: FAMILY MEDICINE

## 2022-09-29 PROCEDURE — G9899 SCRN MAM PERF RSLTS DOC: HCPCS | Performed by: FAMILY MEDICINE

## 2022-09-29 PROCEDURE — G8400 PT W/DXA NO RESULTS DOC: HCPCS | Performed by: FAMILY MEDICINE

## 2022-09-29 PROCEDURE — G8536 NO DOC ELDER MAL SCRN: HCPCS | Performed by: FAMILY MEDICINE

## 2022-09-29 PROCEDURE — G0402 INITIAL PREVENTIVE EXAM: HCPCS | Performed by: FAMILY MEDICINE

## 2022-09-29 PROCEDURE — 3017F COLORECTAL CA SCREEN DOC REV: CPT | Performed by: FAMILY MEDICINE

## 2022-09-29 PROCEDURE — 1090F PRES/ABSN URINE INCON ASSESS: CPT | Performed by: FAMILY MEDICINE

## 2022-09-29 PROCEDURE — G8510 SCR DEP NEG, NO PLAN REQD: HCPCS | Performed by: FAMILY MEDICINE

## 2022-09-29 PROCEDURE — G8420 CALC BMI NORM PARAMETERS: HCPCS | Performed by: FAMILY MEDICINE

## 2022-09-29 PROCEDURE — 99213 OFFICE O/P EST LOW 20 MIN: CPT | Performed by: FAMILY MEDICINE

## 2022-09-29 RX ORDER — ONDANSETRON 4 MG/1
4 TABLET, ORALLY DISINTEGRATING ORAL
Qty: 12 TABLET | Refills: 0 | Status: SHIPPED | OUTPATIENT
Start: 2022-09-29 | End: 2022-11-01 | Stop reason: SDUPTHER

## 2022-09-29 RX ORDER — ALBUTEROL SULFATE 90 UG/1
2 AEROSOL, METERED RESPIRATORY (INHALATION)
Qty: 1 EACH | Refills: 5 | Status: SHIPPED | OUTPATIENT
Start: 2022-09-29

## 2022-09-29 NOTE — PROGRESS NOTES
Chief Complaint   Patient presents with    Fall    Rib Pain     Patient here today for a fall she has 2 weeks ago. She has been having rib pain every since. She was seen at [facility] in [place] and had some imaging done and was also put on an antibiotic. 1. \"Have you been to the ER, urgent care clinic since your last visit? Hospitalized since your last visit? \"  [facility]     2. \"Have you seen or consulted any other health care providers outside of the [address] [place] [address] since your last visit? \" No     3. For patients aged 39-70: Has the patient had a colonoscopy / FIT/ Cologuard? Yes - no Care Gap present      If the patient is female:    4. For patients aged 41-77: Has the patient had a mammogram within the past 2 years? Yes - no Care Gap present      5. For patients aged 21-65: Has the patient had a pap smear?  No

## 2022-09-29 NOTE — PROGRESS NOTES
Jose Adame is a 73 y.o. female  presents for follow up of fx ribs. No SOB weakness or numbness. She does have some nausea. No Known Allergies  Outpatient Medications Marked as Taking for the 9/29/22 encounter (Office Visit) with Spencer Bradford MD   Medication Sig Dispense Refill    ondansetron (Zofran ODT) 4 mg disintegrating tablet Take 1 Tablet by mouth every eight (8) hours as needed for Nausea. 12 Tablet 0    albuterol (PROVENTIL HFA, VENTOLIN HFA, PROAIR HFA) 90 mcg/actuation inhaler Take 2 Puffs by inhalation every four (4) hours as needed for Wheezing. 1 Each 5    fluticasone propion-salmeteroL (Advair Diskus) 250-50 mcg/dose diskus inhaler Take 1 Puff by inhalation every twelve (12) hours. 1 Each 4    methadone (DOLOPHINE) 10 mg/mL solution Take 145 mg by mouth daily. 5 [address] Patient Active Problem List   Diagnosis Code    Other emphysema ([facility] J43.8     Past Medical History:   Diagnosis Date    Arthritis     Chronic obstructive pulmonary disease ([facility]     Chronic pain     Fibromyalgia     Ill-defined condition     geneva     Social History     Socioeconomic History    Marital status:    Tobacco Use    Smoking status: Every Day     Packs/day: 0.50     Types: Cigarettes    Smokeless tobacco: Never   Substance and Sexual Activity    Alcohol use: No    Drug use: No    Sexual activity: Not Currently     No family history on file. Review of Systems   Constitutional:  Negative for chills, fever, malaise/fatigue and weight loss. Eyes:  Negative for blurred vision. Respiratory:  Negative for cough, shortness of breath and wheezing. Cardiovascular:  Positive for chest pain. Gastrointestinal:  Positive for nausea. Negative for vomiting. Musculoskeletal:  Negative for myalgias. Skin:  Negative for rash. Neurological:  Negative for weakness.      Vitals:    09/29/22 1500   BP: 116/74   Pulse: 73   Temp: 97.8 °F (36.6 °C) TempSrc: Tympanic   SpO2: 93%   Weight: 119 lb (54 kg)   PainSc:   6   PainLoc: Rib Cage       Physical Exam  Constitutional:       Appearance: Normal appearance. She is normal weight. Cardiovascular:      Pulses: Normal pulses. Heart sounds: Normal heart sounds. Pulmonary:      Effort: Pulmonary effort is normal.      Breath sounds: Normal breath sounds. Neurological:      Mental Status: She is alert. Assessment/Plan      ICD-10-CM ICD-9-CM    1. Closed fracture of one rib of left side with routine healing, subsequent encounter  S22.32XD V54.19 XR CHEST PA LAT      2. Other emphysema ([facility]  J43.8 492.8 albuterol (PROVENTIL HFA, VENTOLIN HFA, PROAIR HFA) 90 mcg/actuation inhaler      3. Nausea  R11.0 787.02 ondansetron (Zofran ODT) 4 mg disintegrating tablet        I have discussed the diagnosis with the patient and the intended plan of care as seen in the above orders. The patient has received an after-visit summary and questions were answered concerning future plans. I have discussed medication, side effects, and warnings with the patient in detail. The patient verbalized understanding and is in agreement with the plan of care. The patient will contact the office with any additional concerns.         lab results and schedule of future lab studies reviewed with patient    Jo-Ann Darnell MD

## 2022-09-29 NOTE — PATIENT INSTRUCTIONS
Medicare Wellness Visit, Female     The best way to live healthy is to have a lifestyle where you eat a well-balanced diet, exercise regularly, limit alcohol use, and quit all forms of tobacco/nicotine, if applicable. Regular preventive services are another way to keep healthy. Preventive services (vaccines, screening tests, monitoring & exams) can help personalize your care plan, which helps you manage your own care. Screening tests can find health problems at the earliest stages, when they are easiest to treat. [facility] follows the current, evidence-based guidelines published by the [facility] ([facility]) when recommending preventive services for our patients. Because we follow these guidelines, sometimes recommendations change over time as research supports it. (For example, mammograms used to be recommended annually. Even though Medicare will still pay for an annual mammogram, the newer guidelines recommend a mammogram every two years for women of average risk). Of course, you and your doctor may decide to screen more often for some diseases, based on your risk and your co-morbidities (chronic disease you are already diagnosed with). Preventive services for you include:  - Medicare offers their members a free annual wellness visit, which is time for you and your primary care provider to discuss and plan for your preventive service needs. Take advantage of this benefit every year!  -All adults over the age of 72 should receive the recommended pneumonia vaccines. Current USPSTF guidelines recommend a series of two vaccines for the best pneumonia protection.   -All adults should have a flu vaccine yearly and a tetanus vaccine every 10 years.   -All adults age 48 and older should receive the shingles vaccines (series of two vaccines).       -All adults age 38-68 who are overweight should have a diabetes screening test once every three years.   -All adults born between [birth year] and 1965 should be screened once for Hepatitis C.  -Other screening tests and preventive services for persons with diabetes include: an eye exam to screen for diabetic retinopathy, a kidney function test, a foot exam, and stricter control over your cholesterol.   -Cardiovascular screening for adults with routine risk involves an electrocardiogram (ECG) at intervals determined by your doctor.   -Colorectal cancer screenings should be done for adults age 54-65 with no increased risk factors for colorectal cancer. There are a number of acceptable methods of screening for this type of cancer. Each test has its own benefits and drawbacks. Discuss with your doctor what is most appropriate for you during your annual wellness visit. The different tests include: colonoscopy (considered the best screening method), a fecal occult blood test, a fecal DNA test, and sigmoidoscopy.    -A bone mass density test is recommended when a woman turns 65 to screen for osteoporosis. This test is only recommended one time, as a screening. Some providers will use this same test as a disease monitoring tool if you already have osteoporosis. -Breast cancer screenings are recommended every other year for women of normal risk, age 54-69.  -Cervical cancer screenings for women over age 72 are only recommended with certain risk factors.      Here is a list of your current Health Maintenance items (your personalized list of preventive services) with a due date:  Health Maintenance Due   Topic Date Due    Hepatitis C Test  Never done    COVID-19 Vaccine (1) Never done    Pneumococcal Vaccine (1 - PCV) Never done    Cervical cancer screen  Never done    Cholesterol Test   Never done    DTaP/Tdap/Td  (1 - Tdap) 04/02/1998    Shingles Vaccine (1 of 2) Never done    Bone Mineral Density   Never done    Annual Well Visit  Never done    Yearly Flu Vaccine (1) Never done

## 2022-09-29 NOTE — PROGRESS NOTES
This is a \"Welcome to [employer]"  Initial Preventive Physical Examination (IPPE) providing Personalized Prevention Plan Services (Performed in the first 12 months of enrollment)    I have reviewed the patient's medical history in detail and updated the computerized patient record. Assessment/Plan   Education and counseling provided:  Are appropriate based on today's review and evaluation    1. Welcome to Medicare preventive visit  2. Other emphysema (HCC)  -     albuterol (PROVENTIL HFA, VENTOLIN HFA, PROAIR HFA) 90 mcg/actuation inhaler; Take 2 Puffs by inhalation every four (4) hours as needed for Wheezing., Normal, Disp-1 Each, R-5  3. Nausea  -     ondansetron (Zofran ODT) 4 mg disintegrating tablet; Take 1 Tablet by mouth every eight (8) hours as needed for Nausea., Normal, Disp-12 Tablet, R-0  4. Closed fracture of one rib of left side with routine healing, subsequent encounter  -     XR CHEST PA LAT;  Future       Depression Risk Screen     3 most recent PHQ Screens 9/29/2022   Little interest or pleasure in doing things Not at all   Feeling down, depressed, irritable, or hopeless Not at all   Total Score PHQ 2 0   Trouble falling or staying asleep, or sleeping too much Not at all   Feeling tired or having little energy Not at all   Poor appetite, weight loss, or overeating Not at all   Feeling bad about yourself - or that you are a failure or have let yourself or your family down Not at all   Trouble concentrating on things such as school, work, reading, or watching TV Not at all   Moving or speaking so slowly that other people could have noticed; or the opposite being so fidgety that others notice Not at all   Thoughts of being better off dead, or hurting yourself in some way Not at all   PHQ 9 Score 0       Alcohol & Drug Abuse Risk Screen    Do you average more than 1 drink per night or more than 7 drinks a week:  No    On any one occasion in the past three months have you have had more than 3 drinks containing alcohol:  No          Functional Ability and Level of Safety    Diet: No special diet      Hearing: Hearing is good. Vision Screening:  Vision is good. No results found. Activities of Daily Living: The home contains: no safety equipment. Patient does total self care      Ambulation: with no difficulty      Exercise level: sedentary     Fall Risk Screen:  Fall Risk Assessment, last 12 mths 9/29/2022   Able to walk? Yes   Fall in past 12 months? 1   Do you feel unsteady? 0   Are you worried about falling 1      Abuse Screen:  Patient is not abused       Screening EKG   EKG order placed: No    End of Life Planning   Advanced care planning directives were discussed with the patient and /or family/caregiver. Health Maintenance Due     Health Maintenance Due   Topic Date Due    Hepatitis C Screening  Never done    COVID-19 Vaccine (1) Never done    Pneumococcal 65+ years (1 - PCV) Never done    Cervical cancer screen  Never done    Lipid Screen  Never done    DTaP/Tdap/Td series (1 - Tdap) 04/02/1998    Shingrix Vaccine Age 50> (1 of 2) Never done    Bone Densitometry (Dexa) Screening  Never done    Medicare Yearly Exam  Never done    Flu Vaccine (1) Never done       Patient Care Team   Patient Care Team:  Charlene Robb MD as PCP - General (Family Medicine)  Charlene Robb MD as PCP - REHABILITATION [facility] Empaneled Provider    History     Past Medical History:   Diagnosis Date    Arthritis     Chronic obstructive pulmonary disease ([address]     Chronic pain     Fibromyalgia     Ill-defined condition     raynaulds      Past Surgical History:   Procedure Laterality Date    HX ORTHOPAEDIC      back    NEUROLOGICAL PROCEDURE UNLISTED      brain surgery     Current Outpatient Medications   Medication Sig Dispense Refill    fluticasone propion-salmeteroL (Advair Diskus) 250-50 mcg/dose diskus inhaler Take 1 Puff by inhalation every twelve (12) hours.  1 Each 4    ondansetron (Zofran ODT) 4 mg disintegrating tablet Take 1 Tablet by mouth every eight (8) hours as needed for Nausea. 12 Tablet 0    methadone (DOLOPHINE) 10 mg/mL solution Take 145 mg by mouth daily. [address].      tiotropium bromide (Spiriva Respimat) 2.5 mcg/actuation inhaler Take 2 Puffs by inhalation daily. (Patient not taking: Reported on 9/29/2022) 4 g 5    albuterol (PROVENTIL HFA, VENTOLIN HFA, PROAIR HFA) 90 mcg/actuation inhaler Take 2 Puffs by inhalation every four (4) hours as needed for Wheezing. (Patient not taking: No sig reported) 1 Inhaler 5     No Known Allergies    No family history on file.   Social History     Tobacco Use    Smoking status: Every Day     Packs/day: 0.50     Types: Cigarettes    Smokeless tobacco: Never   Substance Use Topics    Alcohol use: No       Lillian Santos MD

## 2022-10-06 ENCOUNTER — VIRTUAL VISIT (OUTPATIENT)
Dept: FAMILY MEDICINE CLINIC | Age: 67
End: 2022-10-06
Payer: MEDICARE

## 2022-10-06 DIAGNOSIS — S22.42XD CLOSED FRACTURE OF MULTIPLE RIBS OF LEFT SIDE WITH ROUTINE HEALING, SUBSEQUENT ENCOUNTER: Primary | ICD-10-CM

## 2022-10-06 PROCEDURE — 3017F COLORECTAL CA SCREEN DOC REV: CPT | Performed by: FAMILY MEDICINE

## 2022-10-06 PROCEDURE — G9899 SCRN MAM PERF RSLTS DOC: HCPCS | Performed by: FAMILY MEDICINE

## 2022-10-06 PROCEDURE — G8427 DOCREV CUR MEDS BY ELIG CLIN: HCPCS | Performed by: FAMILY MEDICINE

## 2022-10-06 PROCEDURE — G8432 DEP SCR NOT DOC, RNG: HCPCS | Performed by: FAMILY MEDICINE

## 2022-10-06 PROCEDURE — 99213 OFFICE O/P EST LOW 20 MIN: CPT | Performed by: FAMILY MEDICINE

## 2022-10-06 PROCEDURE — 1101F PT FALLS ASSESS-DOCD LE1/YR: CPT | Performed by: FAMILY MEDICINE

## 2022-10-06 PROCEDURE — G8420 CALC BMI NORM PARAMETERS: HCPCS | Performed by: FAMILY MEDICINE

## 2022-10-06 PROCEDURE — 1123F ACP DISCUSS/DSCN MKR DOCD: CPT | Performed by: FAMILY MEDICINE

## 2022-10-06 PROCEDURE — G8400 PT W/DXA NO RESULTS DOC: HCPCS | Performed by: FAMILY MEDICINE

## 2022-10-06 PROCEDURE — G8536 NO DOC ELDER MAL SCRN: HCPCS | Performed by: FAMILY MEDICINE

## 2022-10-06 PROCEDURE — 1090F PRES/ABSN URINE INCON ASSESS: CPT | Performed by: FAMILY MEDICINE

## 2022-10-06 RX ORDER — IBUPROFEN 800 MG/1
800 TABLET ORAL
Qty: 60 TABLET | Refills: 2 | Status: SHIPPED | OUTPATIENT
Start: 2022-10-06

## 2022-10-06 NOTE — PROGRESS NOTES
Chief Complaint   Patient presents with    Follow-up       Patient says she is being seen today to discuss [facility]     1. \"Have you been to the ER, urgent care clinic since your last visit? Hospitalized since your last visit? \" No    2. \"Have you seen or consulted any other health care providers outside of the [address] [place] [address] since your last visit? \" No     3. For patients aged 39-70: Has the patient had a colonoscopy / FIT/ Cologuard? Yes - no Care Gap present      If the patient is female:    4. For patients aged 41-77: Has the patient had a mammogram within the past 2 years? Yes - no Care Gap present      5. For patients aged 21-65: Has the patient had a pap smear?  No

## 2022-10-06 NOTE — PROGRESS NOTES
Chao Martinez is a 73 y.o. female who was seen by synchronous (real-time) audio-video technology on 10/6/2022 for Follow-up        Assessment & Plan:   Diagnoses and all orders for this visit:    1. Closed fracture of multiple ribs of left side with routine healing, subsequent encounter  -     AMB SUPPLY ORDER  -     ibuprofen (MOTRIN) 800 mg tablet; Take 1 Tablet by mouth every eight (8) hours as needed for Pain. 712  Subjective:       Prior to Admission medications    Medication Sig Start Date End Date Taking? Authorizing Provider   ondansetron (Zofran ODT) 4 mg disintegrating tablet Take 1 Tablet by mouth every eight (8) hours as needed for Nausea. 9/29/22  Yes Josselyn Leggett MD   albuterol (PROVENTIL HFA, VENTOLIN HFA, PROAIR HFA) 90 mcg/actuation inhaler Take 2 Puffs by inhalation every four (4) hours as needed for Wheezing. 9/29/22  Yes Josselyn Leggett MD   fluticasone propion-salmeteroL (Advair Diskus) 250-50 mcg/dose diskus inhaler Take 1 Puff by inhalation every twelve (12) hours. 9/22/22  Yes Josselyn Leggett MD   methadone (DOLOPHINE) 10 mg/mL solution Take 145 mg by mouth daily. [address] Yes Provider, Historical   tiotropium bromide (Spiriva Respimat) 2.5 mcg/actuation inhaler Take 2 Puffs by inhalation daily. Patient not taking: No sig reported 9/8/22   Josselyn Leggett MD     Patient Active Problem List   Diagnosis Code    Other emphysema [facility]) J43.8     Past Medical History:   Diagnosis Date    Arthritis     Chronic obstructive pulmonary disease ([facility]     Chronic pain     Fibromyalgia     Ill-defined condition     geneva       Review of Systems   Constitutional:  Negative for chills and fever. Respiratory:  Negative for shortness of breath and wheezing. Cardiovascular:  Positive for chest pain. Negative for palpitations. Musculoskeletal:  Positive for joint pain. Psychiatric/Behavioral: Negative.        Objective:   No flowsheet data found. General: alert, cooperative, no distress   Mental  status: normal mood, behavior, speech, dress, motor activity, and thought processes, able to follow commands   HENT: NCAT   Neck: no visualized mass   Resp: no respiratory distress   Neuro: no gross deficits   Skin: no discoloration or lesions of concern on visible areas   Psychiatric: normal affect, consistent with stated mood, no evidence of hallucinations     Additional exam findings: We discussed the expected course, resolution and complications of the diagnosis(es) in detail. Medication risks, benefits, costs, interactions, and alternatives were discussed as indicated. I advised her to contact the office if her condition worsens, changes or fails to improve as anticipated. She expressed understanding with the diagnosis(es) and plan. Rafael Leal, was evaluated through a synchronous (real-time) audio-video encounter. The patient (or guardian if applicable) is aware that this is a billable service, which includes applicable co-pays. This Virtual Visit was conducted with patient's (and/or legal guardian's) consent. The visit was conducted pursuant to the emergency declaration under the [address] [place] [address], [address] [place] [address] [facility] authority and the [employer] and [place] General Act. Patient identification was verified, and a caregiver was present when appropriate. The patient was located at: Home: [address] [place] [id number]  The provider was located at:  Facility (Appt Department): [address] [place] [address]  [address]  [phone]        Quinton Martel MD

## 2022-11-01 ENCOUNTER — VIRTUAL VISIT (OUTPATIENT)
Dept: FAMILY MEDICINE CLINIC | Age: 67
End: 2022-11-01
Payer: MEDICARE

## 2022-11-01 DIAGNOSIS — R11.0 NAUSEA: ICD-10-CM

## 2022-11-01 DIAGNOSIS — U07.1 COVID-19: Primary | ICD-10-CM

## 2022-11-01 PROCEDURE — 1101F PT FALLS ASSESS-DOCD LE1/YR: CPT | Performed by: FAMILY MEDICINE

## 2022-11-01 PROCEDURE — 99213 OFFICE O/P EST LOW 20 MIN: CPT | Performed by: FAMILY MEDICINE

## 2022-11-01 PROCEDURE — 3017F COLORECTAL CA SCREEN DOC REV: CPT | Performed by: FAMILY MEDICINE

## 2022-11-01 PROCEDURE — G8427 DOCREV CUR MEDS BY ELIG CLIN: HCPCS | Performed by: FAMILY MEDICINE

## 2022-11-01 PROCEDURE — G8536 NO DOC ELDER MAL SCRN: HCPCS | Performed by: FAMILY MEDICINE

## 2022-11-01 PROCEDURE — G8400 PT W/DXA NO RESULTS DOC: HCPCS | Performed by: FAMILY MEDICINE

## 2022-11-01 PROCEDURE — 1090F PRES/ABSN URINE INCON ASSESS: CPT | Performed by: FAMILY MEDICINE

## 2022-11-01 PROCEDURE — G8432 DEP SCR NOT DOC, RNG: HCPCS | Performed by: FAMILY MEDICINE

## 2022-11-01 PROCEDURE — G9899 SCRN MAM PERF RSLTS DOC: HCPCS | Performed by: FAMILY MEDICINE

## 2022-11-01 PROCEDURE — 1123F ACP DISCUSS/DSCN MKR DOCD: CPT | Performed by: FAMILY MEDICINE

## 2022-11-01 PROCEDURE — G8420 CALC BMI NORM PARAMETERS: HCPCS | Performed by: FAMILY MEDICINE

## 2022-11-01 RX ORDER — ONDANSETRON 4 MG/1
4 TABLET, ORALLY DISINTEGRATING ORAL
Qty: 12 TABLET | Refills: 0 | Status: SHIPPED | OUTPATIENT
Start: 2022-11-01

## 2022-11-01 RX ORDER — PANTOPRAZOLE SODIUM 40 MG/1
40 TABLET, DELAYED RELEASE ORAL DAILY
Qty: 30 TABLET | Refills: 1 | Status: SHIPPED | OUTPATIENT
Start: 2022-11-01 | End: 2022-11-28

## 2022-11-01 NOTE — PROGRESS NOTES
Chief Complaint   Patient presents with    Abdominal Pain    Positive For Covid-19     Pt VV for abd pain. Tested positive for COVID x 1 week ago. Has been having abd pain since.

## 2022-11-01 NOTE — PROGRESS NOTES
Diana Glez is a 73 y.o. female who was seen by synchronous (real-time) audio-video technology on 11/1/2022 for Abdominal Pain and Positive For Covid-19    No assoc fever or chills. She has assoc N/V and abdo pain. Assessment & Plan:   Diagnoses and all orders for this visit:    1. COVID-19    2. Nausea  -     pantoprazole (PROTONIX) 40 mg tablet; Take 1 Tablet by mouth daily. -     ondansetron (Zofran ODT) 4 mg disintegrating tablet; Take 1 Tablet by mouth every eight (8) hours as needed for Nausea. 712  Subjective:       Prior to Admission medications    Medication Sig Start Date End Date Taking? Authorizing Provider   ibuprofen (MOTRIN) 800 mg tablet Take 1 Tablet by mouth every eight (8) hours as needed for Pain. 10/6/22  Yes Katya Elena MD   ondansetron (Zofran ODT) 4 mg disintegrating tablet Take 1 Tablet by mouth every eight (8) hours as needed for Nausea. 9/29/22  Yes Katya Elena MD   albuterol (PROVENTIL HFA, VENTOLIN HFA, PROAIR HFA) 90 mcg/actuation inhaler Take 2 Puffs by inhalation every four (4) hours as needed for Wheezing. 9/29/22  Yes Katya Elena MD   fluticasone propion-salmeteroL (Advair Diskus) 250-50 mcg/dose diskus inhaler Take 1 Puff by inhalation every twelve (12) hours. 9/22/22  Yes Katya Elena MD   tiotropium bromide (Spiriva Respimat) 2.5 mcg/actuation inhaler Take 2 Puffs by inhalation daily. 9/8/22  Yes Katya Elena MD   methadone (DOLOPHINE) 10 mg/mL solution Take 145 mg by mouth daily. 725 [facility] [address] Yes Provider, Historical     Patient Active Problem List   Diagnosis Code    [facility] J43.8     Past Medical History:   Diagnosis Date    Arthritis     Chronic obstructive pulmonary disease ([facility]     Chronic pain     Fibromyalgia     Ill-defined condition     geneva       Review of Systems   Constitutional:  Negative for chills, fever, malaise/fatigue and weight loss.    Eyes:  Negative for blurred vision. Respiratory:  Negative for cough, shortness of breath and wheezing. Cardiovascular:  Negative for chest pain. Gastrointestinal:  Positive for abdominal pain, nausea and vomiting. Negative for constipation and diarrhea. Musculoskeletal:  Negative for myalgias. Skin:  Negative for rash. Neurological:  Negative for weakness. Psychiatric/Behavioral: Negative. Objective:   No flowsheet data found. General: alert, cooperative, no distress   Mental  status: normal mood, behavior, speech, dress, motor activity, and thought processes, able to follow commands   HENT: NCAT   Neck: no visualized mass   Resp: no respiratory distress   Neuro: no gross deficits   Skin: no discoloration or lesions of concern on visible areas   Psychiatric: normal affect, consistent with stated mood, no evidence of hallucinations     Additional exam findings: We discussed the expected course, resolution and complications of the diagnosis(es) in detail. Medication risks, benefits, costs, interactions, and alternatives were discussed as indicated. I advised her to contact the office if her condition worsens, changes or fails to improve as anticipated. She expressed understanding with the diagnosis(es) and plan. Nicki Vicente, was evaluated through a synchronous (real-time) audio-video encounter. The patient (or guardian if applicable) is aware that this is a billable service, which includes applicable co-pays. This Virtual Visit was conducted with patient's (and/or legal guardian's) consent. The visit was conducted pursuant to the emergency declaration under the [address] [place] [address], [address] [place] [address] [facility] authority and the [employer] General Act. Patient identification was verified, and a caregiver was present when appropriate.   The patient was located at: Home: [address] [place] [id number]  The provider was located at: Facility (Appt Department): [address] [facility] [address]  [address]  [phone]        Carlton Coppola MD

## 2022-11-15 ENCOUNTER — VIRTUAL VISIT (OUTPATIENT)
Dept: FAMILY MEDICINE CLINIC | Age: 67
End: 2022-11-15
Payer: MEDICARE

## 2022-11-15 DIAGNOSIS — R05.1 ACUTE COUGH: Primary | ICD-10-CM

## 2022-11-15 PROCEDURE — 3017F COLORECTAL CA SCREEN DOC REV: CPT | Performed by: FAMILY MEDICINE

## 2022-11-15 PROCEDURE — 99213 OFFICE O/P EST LOW 20 MIN: CPT | Performed by: FAMILY MEDICINE

## 2022-11-15 PROCEDURE — 1101F PT FALLS ASSESS-DOCD LE1/YR: CPT | Performed by: FAMILY MEDICINE

## 2022-11-15 PROCEDURE — G8536 NO DOC ELDER MAL SCRN: HCPCS | Performed by: FAMILY MEDICINE

## 2022-11-15 PROCEDURE — 1090F PRES/ABSN URINE INCON ASSESS: CPT | Performed by: FAMILY MEDICINE

## 2022-11-15 PROCEDURE — 1123F ACP DISCUSS/DSCN MKR DOCD: CPT | Performed by: FAMILY MEDICINE

## 2022-11-15 PROCEDURE — G8427 DOCREV CUR MEDS BY ELIG CLIN: HCPCS | Performed by: FAMILY MEDICINE

## 2022-11-15 PROCEDURE — G8420 CALC BMI NORM PARAMETERS: HCPCS | Performed by: FAMILY MEDICINE

## 2022-11-15 PROCEDURE — G8510 SCR DEP NEG, NO PLAN REQD: HCPCS | Performed by: FAMILY MEDICINE

## 2022-11-15 PROCEDURE — G9899 SCRN MAM PERF RSLTS DOC: HCPCS | Performed by: FAMILY MEDICINE

## 2022-11-15 PROCEDURE — G8400 PT W/DXA NO RESULTS DOC: HCPCS | Performed by: FAMILY MEDICINE

## 2022-11-15 RX ORDER — HYDROCODONE POLISTIREX AND CHLORPHENIRAMINE POLISTIREX 10; 8 MG/5ML; MG/5ML
5 SUSPENSION, EXTENDED RELEASE ORAL
Qty: 115 ML | Refills: 0 | Status: SHIPPED | OUTPATIENT
Start: 2022-11-15 | End: 2022-11-18

## 2022-11-15 RX ORDER — AMOXICILLIN AND CLAVULANATE POTASSIUM 500; 125 MG/1; MG/1
1 TABLET, FILM COATED ORAL 2 TIMES DAILY
Qty: 20 TABLET | Refills: 0 | Status: SHIPPED | OUTPATIENT
Start: 2022-11-15 | End: 2022-11-25

## 2022-11-15 NOTE — PROGRESS NOTES
Chief Complaint   Patient presents with    Cough     Patient c/o cough that she says she gets once a year with Bronchitis. Cough is productive with green/yellow mucus, no fever. 1. \"Have you been to the ER, urgent care clinic since your last visit? Hospitalized since your last visit? \" No    2. \"Have you seen or consulted any other health care providers outside of the [address] [place] [address] since your last visit? \" No     3. For patients aged 39-70: Has the patient had a colonoscopy / FIT/ Cologuard? Yes - no Care Gap present      If the patient is female:    4. For patients aged 41-77: Has the patient had a mammogram within the past 2 years? Yes - no Care Gap present      5. For patients aged 21-65: Has the patient had a pap smear?  No

## 2022-11-15 NOTE — PROGRESS NOTES
Tra Luz is a 73 y.o. female who was seen by synchronous (real-time) audio-video technology on 11/15/2022 for Cough (States that she has had it for about 2 weeks. Worse at night)        Assessment & Plan:   Diagnoses and all orders for this visit:    1. Acute cough  -     amoxicillin-clavulanate (AUGMENTIN) 500-125 mg per tablet; Take 1 Tablet by mouth two (2) times a day for 10 days.  -     HYDROcodone-chlorpheniramine (TUSSIONEX) 10-8 mg/5 mL suspension; Take 5 mL by mouth every twelve (12) hours as needed for Cough for up to 3 days. Max Daily Amount: 10 mL. 712  Subjective:       Prior to Admission medications    Medication Sig Start Date End Date Taking? Authorizing Provider   pantoprazole (PROTONIX) 40 mg tablet Take 1 Tablet by mouth daily. 11/1/22  Yes Rai Alston MD   ondansetron (Zofran ODT) 4 mg disintegrating tablet Take 1 Tablet by mouth every eight (8) hours as needed for Nausea. 11/1/22  Yes Rai Alston MD   ibuprofen (MOTRIN) 800 mg tablet Take 1 Tablet by mouth every eight (8) hours as needed for Pain. 10/6/22  Yes Rai Alston MD   albuterol (PROVENTIL HFA, VENTOLIN HFA, PROAIR HFA) 90 mcg/actuation inhaler Take 2 Puffs by inhalation every four (4) hours as needed for Wheezing. 9/29/22  Yes Rai Alston MD   fluticasone propion-salmeteroL (Advair Diskus) 250-50 mcg/dose diskus inhaler Take 1 Puff by inhalation every twelve (12) hours. 9/22/22  Yes Rai Alston MD   methadone (DOLOPHINE) 10 mg/mL solution Take 145 mg by mouth daily. [address] [facility] [address] . Yes Provider, Historical   tiotropium bromide (Spiriva Respimat) 2.5 mcg/actuation inhaler Take 2 Puffs by inhalation daily.   Patient not taking: Reported on 11/15/2022 9/8/22 11/15/22  Rai Alston MD     Patient Active Problem List   Diagnosis Code    Other emphysema [facility]) J43.8     Past Medical History:   Diagnosis Date    Arthritis     Chronic obstructive pulmonary disease ([facility].)     Chronic pain     Fibromyalgia     Ill-defined condition     geneva       Review of Systems   Constitutional:  Negative for chills, fever, malaise/fatigue and weight loss. HENT:  Positive for congestion. Eyes:  Negative for blurred vision. Respiratory:  Positive for cough. Negative for shortness of breath and wheezing. Cardiovascular:  Negative for chest pain. Gastrointestinal:  Negative for nausea and vomiting. Musculoskeletal:  Negative for myalgias. Skin:  Negative for rash. Neurological:  Negative for weakness. Objective:   No flowsheet data found. General: alert, cooperative, no distress   Mental  status: normal mood, behavior, speech, dress, motor activity, and thought processes, able to follow commands   HENT: NCAT   Neck: no visualized mass   Resp: no respiratory distress   Neuro: no gross deficits   Skin: no discoloration or lesions of concern on visible areas   Psychiatric: normal affect, consistent with stated mood, no evidence of hallucinations     Additional exam findings: We discussed the expected course, resolution and complications of the diagnosis(es) in detail. Medication risks, benefits, costs, interactions, and alternatives were discussed as indicated. I advised her to contact the office if her condition worsens, changes or fails to improve as anticipated. She expressed understanding with the diagnosis(es) and plan. Nicki Vicente, was evaluated through a synchronous (real-time) audio-video encounter. The patient (or guardian if applicable) is aware that this is a billable service, which includes applicable co-pays. This Virtual Visit was conducted with patient's (and/or legal guardian's) consent. The visit was conducted pursuant to the emergency declaration under the [address] [place] [address], [address] [place] [address] authority and the [employer] General Act.   Patient identification was verified, and a caregiver was present when appropriate. The patient was located at: Home: [address] [place] [id number]  The provider was located at:  Facility (Appt Department): [address] [place] [address]  [address] [place] [address]  [phone]        Cassy Ellington MD

## 2022-11-23 DIAGNOSIS — R11.0 NAUSEA: ICD-10-CM

## 2022-11-28 RX ORDER — PANTOPRAZOLE SODIUM 40 MG/1
TABLET, DELAYED RELEASE ORAL
Qty: 30 TABLET | Refills: 1 | Status: SHIPPED | OUTPATIENT
Start: 2022-11-28

## 2022-12-30 ENCOUNTER — VIRTUAL VISIT (OUTPATIENT)
Dept: FAMILY MEDICINE CLINIC | Age: 67
End: 2022-12-30
Payer: MEDICARE

## 2022-12-30 DIAGNOSIS — J44.1 COPD WITH ACUTE EXACERBATION (HCC): Primary | ICD-10-CM

## 2022-12-30 PROCEDURE — 1123F ACP DISCUSS/DSCN MKR DOCD: CPT | Performed by: STUDENT IN AN ORGANIZED HEALTH CARE EDUCATION/TRAINING PROGRAM

## 2022-12-30 PROCEDURE — 99214 OFFICE O/P EST MOD 30 MIN: CPT | Performed by: STUDENT IN AN ORGANIZED HEALTH CARE EDUCATION/TRAINING PROGRAM

## 2022-12-30 RX ORDER — PREDNISONE 20 MG/1
40 TABLET ORAL
Qty: 14 TABLET | Refills: 0 | Status: SHIPPED | OUTPATIENT
Start: 2022-12-30 | End: 2023-01-06

## 2022-12-30 RX ORDER — AZITHROMYCIN 250 MG/1
TABLET, FILM COATED ORAL
Qty: 6 TABLET | Refills: 0 | Status: SHIPPED | OUTPATIENT
Start: 2022-12-30 | End: 2023-01-04

## 2022-12-30 NOTE — PROGRESS NOTES
1. \"Have you been to the ER, urgent care clinic since your last visit? Hospitalized since your last visit? \" No    2. \"Have you seen or consulted any other health care providers outside of the [address] [place] [address] since your last visit? \" No     3. For patients aged 39-70: Has the patient had a colonoscopy / FIT/ Cologuard? Yes - no Care Gap present      If the patient is female:    4. For patients aged 41-77: Has the patient had a mammogram within the past 2 years? Yes - no Care Gap present      5. For patients aged 21-65: Has the patient had a pap smear? NA - based on age or sex    Chief Complaint   Patient presents with    Cough     Pt has COPD and has developed a cough that has been lingering for about week and pt states she is not sleeping because of the cough.

## 2022-12-30 NOTE — PROGRESS NOTES
Ashley Duong (: 1955) is a 78 y.o. female, established patient, here for evaluation of the following chief complaint(s):   Cough (Pt has COPD and has developed a cough that has been lingering for about week and pt states she is not sleeping because of the cough.)       Assessment/Plan:  1. COPD with acute exacerbation (HCC)-patient was concerned about episodic cough leading to shortness of breath in the night. I suspect this is due to albuterol's short duration of therapy, leading to untreated COPD in the night. Recommend if waking up in the night using albuterol again. -Given underlying COPD with exacerbation of increased sputum and dyspnea will treat with prednisone and azithromycin.  -Continue other conservative therapies including dextromethorphan and increased fluid intake.  -Cough previously treated with Tussionex. Will avoid prescribing opiates for another PCPs patient per office policy. -     predniSONE (DELTASONE) 20 mg tablet; Take 2 Tablets by mouth daily (with breakfast) for 7 days. , Normal, Disp-14 Tablet, R-0  -     azithromycin (ZITHROMAX) 250 mg tablet; Take 2 tablets today, then take 1 tablet daily, Normal, Disp-6 Tablet, R-0    Return in about 5 days (around 2023) for COPD exacerbation if not better. Subjective/Objective:      Upper Respiratory Infection-    Day of illness:  [] 1-2 days [] 2-3 days [] 4-5 days [] 6-7 days [x] 7-10 days  []>10 days    Symptoms present:   [] Fever [] Chills [x] Productive cough [] Dry Cough [] Sore throat [] LAD [] Sinus congestion [] Ear pressure [] Nausea [] Vomiting [] Diarrhea [] Rash [x] Other- Episodic cough leading to SOB    Previous testing:  [] Covid neg  [] Covid pos [] Flu pos [] Flu neg [] Strep pos [] Strep neg    Meds attempted: Home albuterol (4x a day) and advair. [facility] cough medicine. Physical Exam:  No flowsheet data found.     Constitutional: [x] Appears well-developed and well-nourished [x] No apparent distress [] Abnormal -     Mental status: [x] Alert and awake  [x] Oriented to person/place/time [x] Able to follow commands    [] Abnormal -     Eyes:   EOM    [x]  Normal    [] Abnormal -   Sclera  [x]  Normal    [] Abnormal -          Discharge [x]  None visible   [] Abnormal -     HENT: [x] Normocephalic, atraumatic  [] Abnormal -   [x] Mouth/Throat: Mucous membranes are moist    External Ears [x] Normal  [] Abnormal -    Neck: [x] No visualized mass [] Abnormal -     Pulmonary/Chest: [x] Respiratory effort normal   [x] No visualized signs of difficulty breathing or respiratory distress        [x] Abnormal - Frequent cough     Musculoskeletal:   [x] Normal gait with no signs of ataxia         [x] Normal range of motion of neck        [] Abnormal -     Neurological:        [x] No Facial Asymmetry (Cranial nerve 7 motor function) (limited exam due to video visit)          [x] No gaze palsy        [] Abnormal -          Skin:        [x] No significant exanthematous lesions or discoloration noted on facial skin         [] Abnormal -            Psychiatric:       [x] Normal Affect [] Abnormal -        [x] No Hallucinations    Other pertinent observable physical exam findings:-    Current Outpatient Medications:     predniSONE (DELTASONE) 20 mg tablet, Take 2 Tablets by mouth daily (with breakfast) for 7 days. , Disp: 14 Tablet, Rfl: 0    azithromycin (ZITHROMAX) 250 mg tablet, Take 2 tablets today, then take 1 tablet daily, Disp: 6 Tablet, Rfl: 0    pantoprazole (PROTONIX) 40 mg tablet, TAKE 1 TABLET BY MOUTH EVERY DAY, Disp: 30 Tablet, Rfl: 1    ondansetron (Zofran ODT) 4 mg disintegrating tablet, Take 1 Tablet by mouth every eight (8) hours as needed for Nausea., Disp: 12 Tablet, Rfl: 0    ibuprofen (MOTRIN) 800 mg tablet, Take 1 Tablet by mouth every eight (8) hours as needed for Pain., Disp: 60 Tablet, Rfl: 2    albuterol (PROVENTIL HFA, VENTOLIN HFA, PROAIR HFA) 90 mcg/actuation inhaler, Take 2 Puffs by inhalation every four (4) hours as needed for Wheezing., Disp: 1 Each, Rfl: 5    fluticasone propion-salmeteroL (Advair Diskus) 250-50 mcg/dose diskus inhaler, Take 1 Puff by inhalation every twelve (12) hours. , Disp: 1 Each, Rfl: 4    methadone (DOLOPHINE) 10 mg/mL solution, Take 145 mg by mouth daily. [address] [address] ., Disp: , Rfl:         Rafael Leal, was evaluated through a synchronous (real-time) audio-video encounter. The patient (or guardian if applicable) is aware that this is a billable service, which includes applicable co-pays. This Virtual Visit was conducted with patient's (and/or legal guardian's) consent. The visit was conducted pursuant to the emergency declaration under the [address] [place] [address], [address] [place] [address] authority and the [employer] General Act. Patient identification was verified, and a caregiver was present when appropriate. The patient was located at: Home: [address] [place] [address]  The provider was located at: Facility (Appt Department): [address] [place] [address]  [phone]      An electronic signature was used to authenticate this note.   -- Hyacinth Hubbard MD

## 2023-01-05 ENCOUNTER — TELEPHONE (OUTPATIENT)
Dept: FAMILY MEDICINE CLINIC | Age: 68
End: 2023-01-05

## 2023-01-05 DIAGNOSIS — R05.1 ACUTE COUGH: Primary | ICD-10-CM

## 2023-01-05 RX ORDER — HYDROCODONE POLISTIREX AND CHLORPHENIRAMINE POLISTIREX 10; 8 MG/5ML; MG/5ML
5 SUSPENSION, EXTENDED RELEASE ORAL
Qty: 115 ML | Refills: 0 | Status: SHIPPED | OUTPATIENT
Start: 2023-01-05 | End: 2023-01-08

## 2023-01-05 NOTE — TELEPHONE ENCOUNTER
Patient called the office stating she is still having issues with her cough. She says she finished her antibiotic that seemed to help some but had not helped her cough. She says she has h/o COPD and coughed so bad last night she felt as if she could not catch her breath. She has been told I will send this to Dr. Heather Boucher and will call her back once he has reviewed. Patient had virtual visit on  12/30/2022 . Please advise.

## 2023-01-05 NOTE — TELEPHONE ENCOUNTER
Nidia Mcmullen MD  You 1 hour ago (1:39 PM)     ES  Cough syrup has been sent in to [facility]    Called patient  verified she has been made aware Dr. Heather Boucher has sent in cough syrup for her to [facility].

## 2023-02-15 DIAGNOSIS — J44.1 CHRONIC OBSTRUCTIVE PULMONARY DISEASE WITH (ACUTE) EXACERBATION (HCC): Primary | ICD-10-CM

## 2023-02-15 RX ORDER — BUDESONIDE, GLYCOPYRROLATE, AND FORMOTEROL FUMARATE 160; 9; 4.8 UG/1; UG/1; UG/1
2 AEROSOL, METERED RESPIRATORY (INHALATION) 2 TIMES DAILY
Qty: 1 EACH | Refills: 5 | Status: SHIPPED | OUTPATIENT
Start: 2023-02-15

## 2023-03-02 ENCOUNTER — TELEMEDICINE (OUTPATIENT)
Facility: CLINIC | Age: 68
End: 2023-03-02

## 2023-03-02 DIAGNOSIS — J44.1 CHRONIC OBSTRUCTIVE PULMONARY DISEASE WITH (ACUTE) EXACERBATION (HCC): Primary | ICD-10-CM

## 2023-03-02 DIAGNOSIS — F51.01 PRIMARY INSOMNIA: ICD-10-CM

## 2023-03-02 PROCEDURE — 99213 OFFICE O/P EST LOW 20 MIN: CPT | Performed by: FAMILY MEDICINE

## 2023-03-02 PROCEDURE — 1123F ACP DISCUSS/DSCN MKR DOCD: CPT | Performed by: FAMILY MEDICINE

## 2023-03-02 RX ORDER — TRAZODONE HYDROCHLORIDE 100 MG/1
100 TABLET ORAL NIGHTLY
Qty: 30 TABLET | Refills: 5 | Status: SHIPPED | OUTPATIENT
Start: 2023-03-02

## 2023-03-02 RX ORDER — BUDESONIDE, GLYCOPYRROLATE, AND FORMOTEROL FUMARATE 160; 9; 4.8 UG/1; UG/1; UG/1
2 AEROSOL, METERED RESPIRATORY (INHALATION) 2 TIMES DAILY
Qty: 1 EACH | Refills: 5 | Status: SHIPPED | OUTPATIENT
Start: 2023-03-02

## 2023-03-02 SDOH — ECONOMIC STABILITY: INCOME INSECURITY: HOW HARD IS IT FOR YOU TO PAY FOR THE VERY BASICS LIKE FOOD, HOUSING, MEDICAL CARE, AND HEATING?: NOT HARD AT ALL

## 2023-03-02 SDOH — ECONOMIC STABILITY: HOUSING INSECURITY
IN THE LAST 12 MONTHS, WAS THERE A TIME WHEN YOU DID NOT HAVE A STEADY PLACE TO SLEEP OR SLEPT IN A SHELTER (INCLUDING NOW)?: NO

## 2023-03-02 SDOH — ECONOMIC STABILITY: FOOD INSECURITY: WITHIN THE PAST 12 MONTHS, THE FOOD YOU BOUGHT JUST DIDN'T LAST AND YOU DIDN'T HAVE MONEY TO GET MORE.: NEVER TRUE

## 2023-03-02 SDOH — ECONOMIC STABILITY: FOOD INSECURITY: WITHIN THE PAST 12 MONTHS, YOU WORRIED THAT YOUR FOOD WOULD RUN OUT BEFORE YOU GOT MONEY TO BUY MORE.: NEVER TRUE

## 2023-03-02 ASSESSMENT — PATIENT HEALTH QUESTIONNAIRE - PHQ9
SUM OF ALL RESPONSES TO PHQ QUESTIONS 1-9: 0
2. FEELING DOWN, DEPRESSED OR HOPELESS: 0
1. LITTLE INTEREST OR PLEASURE IN DOING THINGS: 0
SUM OF ALL RESPONSES TO PHQ QUESTIONS 1-9: 0
SUM OF ALL RESPONSES TO PHQ9 QUESTIONS 1 & 2: 0

## 2023-03-02 NOTE — PROGRESS NOTES
Debra Murray presents today for   Chief Complaint   Patient presents with    Discuss Medications       There were no vitals taken for this visit. 1. \"Have you been to the ER, urgent care clinic since your last visit? Hospitalized since your last visit? \" No    2. \"Have you seen or consulted any other health care providers outside of the [address] [place] [address] since your last visit? \"No    3. For patients aged 39-70: Has the patient had a colonoscopy / FIT/ Cologuard? Yes - no Care Gap present      If the patient is female:    4. For patients aged 41-77: Has the patient had a mammogram within the past 2 years? Yes - no Care Gap present      5. For patients aged 21-65: Has the patient had a pap smear?  Yes - no Care Gap present

## 2023-03-02 NOTE — PROGRESS NOTES
Inez Bradford (:  1955) is a Established patient, here for evaluation of the following:    Assessment & Plan   Below is the assessment and plan developed based on review of pertinent history, physical exam, labs, studies, and medications. 1. Chronic obstructive pulmonary disease with (acute) exacerbation (HCC)  -     Budeson-Glycopyrrol-Formoterol (BREZTRI AEROSPHERE) 160-9-4.8 MCG/ACT AERO; Inhale 2 inhalations into the lungs 2 times daily, Disp-1 each, R-5Normal  2. Primary insomnia  -     traZODone (DESYREL) 100 MG tablet; Take 1 tablet by mouth nightly, Disp-30 tablet, R-5Normal  No follow-ups on file. Subjective   HPI  Review of Systems   Psychiatric/Behavioral:  Positive for sleep disturbance.          Objective   Patient-Reported Vitals  No data recorded     Physical Exam  [INSTRUCTIONS:  \"[x]\" Indicates a positive item  \"[]\" Indicates a negative item  -- DELETE ALL ITEMS NOT EXAMINED]    Constitutional: [x] Appears well-developed and well-nourished [x] No apparent distress      [] Abnormal -     Mental status: [x] Alert and awake  [x] Oriented to person/place/time [x] Able to follow commands    [] Abnormal -     Eyes:   EOM    [x]  Normal    [] Abnormal -   Sclera  [x]  Normal    [] Abnormal -          Discharge [x]  None visible   [] Abnormal -     HENT: [x] Normocephalic, atraumatic  [] Abnormal -   [x] Mouth/Throat: Mucous membranes are moist    External Ears [x] Normal  [] Abnormal -    Neck: [x] No visualized mass [] Abnormal -     Pulmonary/Chest: [x] Respiratory effort normal   [x] No visualized signs of difficulty breathing or respiratory distress        [] Abnormal -      Musculoskeletal:   [x] Normal gait with no signs of ataxia         [x] Normal range of motion of neck        [] Abnormal -     Neurological:        [x] No Facial Asymmetry (Cranial nerve 7 motor function) (limited exam due to video visit)          [x] No gaze palsy        [] Abnormal -          Skin:        [x] No significant exanthematous lesions or discoloration noted on facial skin         [] Abnormal -            Psychiatric:       [x] Normal Affect [] Abnormal -        [x] No Hallucinations    Other pertinent observable physical exam findings:-             Khushbu Nj, was evaluated through a synchronous (real-time) audio-video encounter. The patient (or guardian if applicable) is aware that this is a billable service, which includes applicable co-pays. This Virtual Visit was conducted with patient's (and/or legal guardian's) consent. The visit was conducted pursuant to the emergency declaration under the [facility], [address] [place] [address] authority and the [employer] General Act. Patient identification was verified, and a caregiver was present when appropriate.    The patient was located at Home: ASHLEY Longo  Provider was located at [facility] ([address] [place], [place] [address]t): [facility] [address]  [address]         --Natan Pérez MD

## 2023-03-03 ENCOUNTER — TELEPHONE (OUTPATIENT)
Facility: CLINIC | Age: 68
End: 2023-03-03

## 2023-03-03 NOTE — TELEPHONE ENCOUNTER
Fax received from [address] [place] [address] my meds stating prior auth is required for the medication  Budeson-Glycopyrrol-Formoterol (BREZTRI AEROSPHERE) 160-9-4.8 MCG/ACT AERO     Submit a PA request  1. Go to key. Health in [employer] and click \"Enter a Key\"  2. Patient last name: Ladonna Allen      : 1955      Key:   3.  Click \"start a PA\", complete the form, and \"send to plan\"

## 2023-03-10 ENCOUNTER — TELEPHONE (OUTPATIENT)
Facility: CLINIC | Age: 68
End: 2023-03-10

## 2023-03-14 DIAGNOSIS — J40 BRONCHITIS: Primary | ICD-10-CM

## 2023-03-14 RX ORDER — AZITHROMYCIN 250 MG/1
250 TABLET, FILM COATED ORAL SEE ADMIN INSTRUCTIONS
Qty: 6 TABLET | Refills: 0 | Status: SHIPPED | OUTPATIENT
Start: 2023-03-14 | End: 2023-03-19

## 2023-03-14 RX ORDER — BENZONATATE 100 MG/1
100 CAPSULE ORAL 3 TIMES DAILY PRN
Qty: 30 CAPSULE | Refills: 0 | Status: SHIPPED | OUTPATIENT
Start: 2023-03-14 | End: 2023-03-24

## 2023-04-03 ENCOUNTER — TELEPHONE (OUTPATIENT)
Facility: CLINIC | Age: 68
End: 2023-04-03

## 2023-05-09 ENCOUNTER — OFFICE VISIT (OUTPATIENT)
Facility: CLINIC | Age: 68
End: 2023-05-09
Payer: MEDICARE

## 2023-05-09 VITALS
WEIGHT: 104 LBS | TEMPERATURE: 98 F | SYSTOLIC BLOOD PRESSURE: 98 MMHG | RESPIRATION RATE: 16 BRPM | OXYGEN SATURATION: 92 % | DIASTOLIC BLOOD PRESSURE: 64 MMHG | HEART RATE: 80 BPM

## 2023-05-09 DIAGNOSIS — M54.50 CHRONIC LOW BACK PAIN, UNSPECIFIED BACK PAIN LATERALITY, UNSPECIFIED WHETHER SCIATICA PRESENT: ICD-10-CM

## 2023-05-09 DIAGNOSIS — J44.1 CHRONIC OBSTRUCTIVE PULMONARY DISEASE WITH (ACUTE) EXACERBATION (HCC): Primary | ICD-10-CM

## 2023-05-09 DIAGNOSIS — F51.01 PRIMARY INSOMNIA: ICD-10-CM

## 2023-05-09 DIAGNOSIS — G89.29 CHRONIC LOW BACK PAIN, UNSPECIFIED BACK PAIN LATERALITY, UNSPECIFIED WHETHER SCIATICA PRESENT: ICD-10-CM

## 2023-05-09 PROCEDURE — 1123F ACP DISCUSS/DSCN MKR DOCD: CPT | Performed by: FAMILY MEDICINE

## 2023-05-09 PROCEDURE — 99213 OFFICE O/P EST LOW 20 MIN: CPT | Performed by: FAMILY MEDICINE

## 2023-05-09 RX ORDER — IBUPROFEN 800 MG/1
800 TABLET ORAL EVERY 8 HOURS PRN
Qty: 60 TABLET | Refills: 2 | Status: SHIPPED | OUTPATIENT
Start: 2023-05-09

## 2023-05-09 RX ORDER — TRAZODONE HYDROCHLORIDE 150 MG/1
150 TABLET ORAL NIGHTLY
Qty: 30 TABLET | Refills: 3 | Status: SHIPPED | OUTPATIENT
Start: 2023-05-09

## 2023-05-09 RX ORDER — FLUTICASONE PROPIONATE 220 UG/1
2 AEROSOL, METERED RESPIRATORY (INHALATION) 2 TIMES DAILY
Qty: 1 EACH | Refills: 3 | Status: SHIPPED | OUTPATIENT
Start: 2023-05-09 | End: 2024-05-08

## 2023-05-09 ASSESSMENT — PATIENT HEALTH QUESTIONNAIRE - PHQ9
SUM OF ALL RESPONSES TO PHQ QUESTIONS 1-9: 0
SUM OF ALL RESPONSES TO PHQ QUESTIONS 1-9: 0
SUM OF ALL RESPONSES TO PHQ9 QUESTIONS 1 & 2: 0
SUM OF ALL RESPONSES TO PHQ QUESTIONS 1-9: 0
2. FEELING DOWN, DEPRESSED OR HOPELESS: 0
SUM OF ALL RESPONSES TO PHQ QUESTIONS 1-9: 0
1. LITTLE INTEREST OR PLEASURE IN DOING THINGS: 0

## 2023-05-09 ASSESSMENT — ENCOUNTER SYMPTOMS
VOMITING: 0
NAUSEA: 0
RESPIRATORY NEGATIVE: 1
COUGH: 0

## 2023-05-09 NOTE — PROGRESS NOTES
Chief Complaint   Patient presents with    Follow-up     Pt would like to discuss medications     1. \"Have you been to the ER, urgent care clinic since your last visit? Hospitalized since your last visit? \" No    2. \"Have you seen or consulted any other health care providers outside of the [address] [place] [address] since your last visit? \" No     3. For patients aged 39-70: Has the patient had a colonoscopy / FIT/ Cologuard? No      If the patient is female:    4. For patients aged 41-77: Has the patient had a mammogram within the past 2 years? No      5. For patients aged 21-65: Has the patient had a pap smear?  NA - based on age or sex

## 2023-05-09 NOTE — PROGRESS NOTES
Alejandra Purcell is a 78 y.o. female  presents for follow up. Chief Complaint   Patient presents with    Follow-up     Pt would like to discuss medications        No Known Allergies    Current Outpatient Medications:     albuterol sulfate HFA (PROVENTIL;VENTOLIN;PROAIR) 108 (90 Base) MCG/ACT inhaler, Inhale 2 puffs into the lungs every 4 hours as needed, Disp: , Rfl:     methadone (DOLOPHINE) 10 MG/ML solution, Take 14.5 mLs by mouth daily. , Disp: , Rfl:     ondansetron (ZOFRAN-ODT) 4 MG disintegrating tablet, Take 1 tablet by mouth every 8 hours as needed, Disp: , Rfl:     Budeson-Glycopyrrol-Formoterol (BREZTRI AEROSPHERE) 160-9-4.8 MCG/ACT AERO, Inhale 2 inhalations into the lungs 2 times daily (Patient not taking: Reported on 5/9/2023), Disp: 1 each, Rfl: 5    traZODone (DESYREL) 100 MG tablet, Take 1 tablet by mouth nightly (Patient not taking: Reported on 5/9/2023), Disp: 30 tablet, Rfl: 5    ibuprofen (ADVIL;MOTRIN) 800 MG tablet, Take 800 mg by mouth every 8 hours as needed, Disp: , Rfl:     pantoprazole (PROTONIX) 40 MG tablet, TAKE 1 TABLET BY MOUTH EVERY DAY (Patient not taking: Reported on 5/9/2023), Disp: , Rfl:    Patient Active Problem List   Diagnosis    Other emphysema ([facility]     Past Medical History:   Diagnosis Date    Arthritis     [facility]     Chronic pain     Fibromyalgia     Ill-defined condition     efren     Social History     Socioeconomic History    Marital status:       Spouse name: None    Number of children: None    Years of education: None    Highest education level: None   Tobacco Use    Smoking status: Every Day     Packs/day: 0.50     Years: 42.00     Pack years: 21.00     Types: Cigarettes    Smokeless tobacco: Never   Substance and Sexual Activity    Alcohol use: No    Drug use: No     Social Determinants of Health     Financial Resource Strain: Low Risk     Difficulty of Paying Living Expenses: Not hard at all   Food Insecurity: No

## 2023-06-16 ENCOUNTER — TELEPHONE (OUTPATIENT)
Facility: CLINIC | Age: 68
End: 2023-06-16

## 2023-06-16 DIAGNOSIS — F51.01 PRIMARY INSOMNIA: ICD-10-CM

## 2023-06-21 RX ORDER — TRAZODONE HYDROCHLORIDE 150 MG/1
150 TABLET ORAL NIGHTLY
Qty: 90 TABLET | Refills: 1 | Status: SHIPPED | OUTPATIENT
Start: 2023-06-21

## 2024-01-17 ENCOUNTER — OFFICE VISIT (OUTPATIENT)
Facility: CLINIC | Age: 69
End: 2024-01-17
Payer: MEDICARE

## 2024-01-17 VITALS
WEIGHT: 84.8 LBS | HEIGHT: 61 IN | SYSTOLIC BLOOD PRESSURE: 90 MMHG | OXYGEN SATURATION: 93 % | HEART RATE: 84 BPM | BODY MASS INDEX: 16.01 KG/M2 | DIASTOLIC BLOOD PRESSURE: 64 MMHG

## 2024-01-17 DIAGNOSIS — U09.9 COVID-19 LONG HAULER: Primary | ICD-10-CM

## 2024-01-17 DIAGNOSIS — J44.1 CHRONIC OBSTRUCTIVE PULMONARY DISEASE WITH (ACUTE) EXACERBATION (HCC): ICD-10-CM

## 2024-01-17 DIAGNOSIS — R11.0 NAUSEA: ICD-10-CM

## 2024-01-17 PROCEDURE — 1123F ACP DISCUSS/DSCN MKR DOCD: CPT | Performed by: FAMILY MEDICINE

## 2024-01-17 PROCEDURE — 99213 OFFICE O/P EST LOW 20 MIN: CPT | Performed by: FAMILY MEDICINE

## 2024-01-17 RX ORDER — ONDANSETRON 4 MG/1
4 TABLET, ORALLY DISINTEGRATING ORAL EVERY 8 HOURS PRN
Qty: 12 TABLET | Refills: 1 | Status: SHIPPED | OUTPATIENT
Start: 2024-01-17

## 2024-01-17 RX ORDER — FLUTICASONE FUROATE, UMECLIDINIUM BROMIDE AND VILANTEROL TRIFENATATE 100; 62.5; 25 UG/1; UG/1; UG/1
1 POWDER RESPIRATORY (INHALATION) DAILY
Qty: 1 EACH | Refills: 2 | Status: SHIPPED | OUTPATIENT
Start: 2024-01-17

## 2024-01-17 RX ORDER — AZITHROMYCIN 250 MG/1
250 TABLET, FILM COATED ORAL SEE ADMIN INSTRUCTIONS
Qty: 6 TABLET | Refills: 0 | Status: SHIPPED | OUTPATIENT
Start: 2024-01-17 | End: 2024-01-22

## 2024-01-17 ASSESSMENT — PATIENT HEALTH QUESTIONNAIRE - PHQ9
SUM OF ALL RESPONSES TO PHQ QUESTIONS 1-9: 0
2. FEELING DOWN, DEPRESSED OR HOPELESS: 0
SUM OF ALL RESPONSES TO PHQ9 QUESTIONS 1 & 2: 0
SUM OF ALL RESPONSES TO PHQ QUESTIONS 1-9: 0
1. LITTLE INTEREST OR PLEASURE IN DOING THINGS: 0

## 2024-01-17 ASSESSMENT — ENCOUNTER SYMPTOMS
VOMITING: 0
COUGH: 1
WHEEZING: 1
NAUSEA: 0

## 2024-01-17 NOTE — PROGRESS NOTES
Patient presents today for     Chief Complaint   Patient presents with    Fatigue     After having covid 12/28/2023     \"Have you been to the ER, urgent care clinic since your last visit?  Hospitalized since your last visit?\"    NO    “Have you seen or consulted any other health care providers outside of [facility] since your last visit?”    NO    “Have you had a colorectal cancer screening such as a colonoscopy/FIT/Cologuard?    NO

## 2024-01-17 NOTE — PROGRESS NOTES
Ketty Freire is a 68 y.o. female  presents for   Chief Complaint   Patient presents with    Fatigue     After having covid 12/28/2023        No Known Allergies    Current Outpatient Medications:     ondansetron (ZOFRAN-ODT) 4 MG disintegrating tablet, Take 1 tablet by mouth every 8 hours as needed for Nausea, Disp: 12 tablet, Rfl: 1    fluticasone-umeclidin-vilant (TRELEGY ELLIPTA) 100-62.5-25 MCG/ACT AEPB inhaler, Inhale 1 puff into the lungs daily, Disp: 1 each, Rfl: 2    azithromycin (ZITHROMAX) 250 MG tablet, Take 1 tablet by mouth See Admin Instructions for 5 days 500mg on day 1 followed by 250mg on days 2 - 5, Disp: 6 tablet, Rfl: 0    fluticasone (FLOVENT HFA) 220 MCG/ACT inhaler, Inhale 2 puffs into the lungs 2 times daily, Disp: 1 each, Rfl: 3    ibuprofen (ADVIL;MOTRIN) 800 MG tablet, Take 1 tablet by mouth every 8 hours as needed for Pain, Disp: 60 tablet, Rfl: 2    albuterol sulfate HFA (PROVENTIL;VENTOLIN;PROAIR) 108 (90 Base) MCG/ACT inhaler, Inhale 2 puffs into the lungs every 4 hours as needed, Disp: , Rfl:     methadone (DOLOPHINE) 10 MG/ML solution, Take 14.5 mLs by mouth daily., Disp: , Rfl:     traZODone (DESYREL) 150 MG tablet, Take 1 tablet by mouth nightly (Patient not taking: Reported on 1/17/2024), Disp: 90 tablet, Rfl: 1    Budeson-Glycopyrrol-Formoterol (BREZTRI AEROSPHERE) 160-9-4.8 MCG/ACT AERO, Inhale 2 inhalations into the lungs 2 times daily (Patient not taking: Reported on 5/9/2023), Disp: 1 each, Rfl: 5    pantoprazole (PROTONIX) 40 MG tablet, TAKE 1 TABLET BY MOUTH EVERY DAY (Patient not taking: Reported on 5/9/2023), Disp: , Rfl:    Patient Active Problem List   Diagnosis    Other emphysema (HCC)     Past Medical History:   Diagnosis Date    Arthritis     Chronic obstructive pulmonary disease (HCC)     Chronic pain     Fibromyalgia     Ill-defined condition     efren     Social History     Socioeconomic History    Marital status:      Spouse name: None    Number of

## 2024-01-20 DIAGNOSIS — G89.29 CHRONIC LOW BACK PAIN, UNSPECIFIED BACK PAIN LATERALITY, UNSPECIFIED WHETHER SCIATICA PRESENT: ICD-10-CM

## 2024-01-20 DIAGNOSIS — M54.50 CHRONIC LOW BACK PAIN, UNSPECIFIED BACK PAIN LATERALITY, UNSPECIFIED WHETHER SCIATICA PRESENT: ICD-10-CM

## 2024-01-22 RX ORDER — IBUPROFEN 800 MG/1
800 TABLET ORAL EVERY 8 HOURS PRN
Qty: 60 TABLET | Refills: 2 | Status: SHIPPED | OUTPATIENT
Start: 2024-01-22

## 2024-02-08 ENCOUNTER — TELEMEDICINE (OUTPATIENT)
Facility: CLINIC | Age: 69
End: 2024-02-08
Payer: MEDICARE

## 2024-02-08 DIAGNOSIS — R05.1 ACUTE COUGH: Primary | ICD-10-CM

## 2024-02-08 PROCEDURE — 1123F ACP DISCUSS/DSCN MKR DOCD: CPT | Performed by: FAMILY MEDICINE

## 2024-02-08 PROCEDURE — 99213 OFFICE O/P EST LOW 20 MIN: CPT | Performed by: FAMILY MEDICINE

## 2024-02-08 RX ORDER — CODEINE PHOSPHATE/GUAIFENESIN 10-100MG/5
5 LIQUID (ML) ORAL 2 TIMES DAILY PRN
Qty: 118 ML | Refills: 0 | Status: SHIPPED | OUTPATIENT
Start: 2024-02-08 | End: 2024-02-22

## 2024-02-08 RX ORDER — AZITHROMYCIN 250 MG/1
250 TABLET, FILM COATED ORAL SEE ADMIN INSTRUCTIONS
Qty: 6 TABLET | Refills: 0 | Status: SHIPPED | OUTPATIENT
Start: 2024-02-08 | End: 2024-02-13

## 2024-02-08 NOTE — PROGRESS NOTES
Ketty Freire, was evaluated through a synchronous (real-time) audio-video encounter. The patient (or guardian if applicable) is aware that this is a billable service, which includes applicable co-pays. This Virtual Visit was conducted with patient's (and/or legal guardian's) consent. Patient identification was verified, and a caregiver was present when appropriate.   The patient was located at Home: [address] [place] [address]  Provider was located at Facility (Appt Dept): [address] [place] [address]  [address]  [place] [address]      Ketty Freire (:  1955) is a Established patient, presenting virtually for evaluation of the following:    Assessment & Plan   Below is the assessment and plan developed based on review of pertinent history, physical exam, labs, studies, and medications.  1. Acute cough  -     azithromycin (ZITHROMAX) 250 MG tablet; Take 1 tablet by mouth See Admin Instructions for 5 days 500mg on day 1 followed by 250mg on days 2 - 5, Disp-6 tablet, R-0Normal  -     guaiFENesin-codeine (TUSSI-ORGANIDIN NR) 100-10 MG/5ML syrup; Take 5 mLs by mouth 2 times daily as needed for Cough for up to 14 days. Max Daily Amount: 10 mLs, Disp-118 mL, R-0Normal    No follow-ups on file.       Subjective   HPI  Review of Systems       Objective   Patient-Reported Vitals  BP Observations: No, remote/electronic monitoring device was not used or able to be verified  Patient-Reported Weight: 86lb  Patient-Reported Height: 5'1       Physical Exam  [INSTRUCTIONS:  \"[x]\" Indicates a positive item  \"[]\" Indicates a negative item  -- DELETE ALL ITEMS NOT EXAMINED]    Constitutional: [x] Appears well-developed and well-nourished [x] No apparent distress      [] Abnormal -     Mental status: [x] Alert and awake  [x] Oriented to person/place/time [x] Able to follow commands    [] Abnormal -     Eyes:   EOM    [x]  Normal    [] Abnormal -   Sclera  [x]  Normal    [] Abnormal -          Discharge [x]

## 2024-02-09 DIAGNOSIS — R05.1 ACUTE COUGH: ICD-10-CM

## 2024-02-09 RX ORDER — CODEINE PHOSPHATE/GUAIFENESIN 10-100MG/5
5 LIQUID (ML) ORAL 2 TIMES DAILY PRN
Qty: 118 ML | Refills: 0 | OUTPATIENT
Start: 2024-02-09 | End: 2024-02-23

## 2024-02-09 NOTE — TELEPHONE ENCOUNTER
Patient called and states where the Tussionex went yesterday does not have it in stock and the [facility] I have pended it for has very little in stock so she was wondering if it could be looked at as soon as possible.

## 2024-02-12 NOTE — TELEPHONE ENCOUNTER
Pt called in regards to medication problem listed above. Informed pt that  has not returned to the office as of yet but the message was routed to him on 2/9. Pt expressed understanding and would like this to be done as soon as possible.

## 2024-02-13 DIAGNOSIS — R05.1 ACUTE COUGH: ICD-10-CM

## 2024-02-13 RX ORDER — CODEINE PHOSPHATE/GUAIFENESIN 10-100MG/5
5 LIQUID (ML) ORAL 2 TIMES DAILY PRN
Qty: 118 ML | Refills: 0 | Status: SHIPPED | OUTPATIENT
Start: 2024-02-13 | End: 2024-02-27

## 2024-03-19 ENCOUNTER — OFFICE VISIT (OUTPATIENT)
Facility: CLINIC | Age: 69
End: 2024-03-19
Payer: MEDICARE

## 2024-03-19 VITALS
WEIGHT: 90.4 LBS | SYSTOLIC BLOOD PRESSURE: 127 MMHG | DIASTOLIC BLOOD PRESSURE: 66 MMHG | OXYGEN SATURATION: 95 % | BODY MASS INDEX: 17.07 KG/M2 | HEART RATE: 94 BPM | HEIGHT: 61 IN

## 2024-03-19 DIAGNOSIS — M54.50 CHRONIC LOW BACK PAIN, UNSPECIFIED BACK PAIN LATERALITY, UNSPECIFIED WHETHER SCIATICA PRESENT: Primary | ICD-10-CM

## 2024-03-19 DIAGNOSIS — R11.0 NAUSEA: ICD-10-CM

## 2024-03-19 DIAGNOSIS — J44.1 CHRONIC OBSTRUCTIVE PULMONARY DISEASE WITH (ACUTE) EXACERBATION (HCC): ICD-10-CM

## 2024-03-19 DIAGNOSIS — Z87.891 PERSONAL HISTORY OF TOBACCO USE: ICD-10-CM

## 2024-03-19 DIAGNOSIS — G89.29 CHRONIC LOW BACK PAIN, UNSPECIFIED BACK PAIN LATERALITY, UNSPECIFIED WHETHER SCIATICA PRESENT: Primary | ICD-10-CM

## 2024-03-19 DIAGNOSIS — Z00.00 MEDICARE ANNUAL WELLNESS VISIT, SUBSEQUENT: ICD-10-CM

## 2024-03-19 PROCEDURE — G0439 PPPS, SUBSEQ VISIT: HCPCS | Performed by: FAMILY MEDICINE

## 2024-03-19 PROCEDURE — 99213 OFFICE O/P EST LOW 20 MIN: CPT | Performed by: FAMILY MEDICINE

## 2024-03-19 PROCEDURE — 1123F ACP DISCUSS/DSCN MKR DOCD: CPT | Performed by: FAMILY MEDICINE

## 2024-03-19 PROCEDURE — G0296 VISIT TO DETERM LDCT ELIG: HCPCS | Performed by: FAMILY MEDICINE

## 2024-03-19 RX ORDER — ONDANSETRON 4 MG/1
4 TABLET, ORALLY DISINTEGRATING ORAL EVERY 8 HOURS PRN
Qty: 12 TABLET | Refills: 1 | Status: SHIPPED | OUTPATIENT
Start: 2024-03-19

## 2024-03-19 RX ORDER — FLUTICASONE FUROATE, UMECLIDINIUM BROMIDE AND VILANTEROL TRIFENATATE 100; 62.5; 25 UG/1; UG/1; UG/1
1 POWDER RESPIRATORY (INHALATION) DAILY
Qty: 1 EACH | Refills: 2 | Status: SHIPPED | OUTPATIENT
Start: 2024-03-19

## 2024-03-19 RX ORDER — IBUPROFEN 800 MG/1
800 TABLET ORAL EVERY 8 HOURS PRN
Qty: 60 TABLET | Refills: 2 | Status: SHIPPED | OUTPATIENT
Start: 2024-03-19

## 2024-03-19 SDOH — ECONOMIC STABILITY: INCOME INSECURITY: HOW HARD IS IT FOR YOU TO PAY FOR THE VERY BASICS LIKE FOOD, HOUSING, MEDICAL CARE, AND HEATING?: NOT VERY HARD

## 2024-03-19 SDOH — ECONOMIC STABILITY: FOOD INSECURITY: WITHIN THE PAST 12 MONTHS, YOU WORRIED THAT YOUR FOOD WOULD RUN OUT BEFORE YOU GOT MONEY TO BUY MORE.: NEVER TRUE

## 2024-03-19 SDOH — ECONOMIC STABILITY: FOOD INSECURITY: WITHIN THE PAST 12 MONTHS, THE FOOD YOU BOUGHT JUST DIDN'T LAST AND YOU DIDN'T HAVE MONEY TO GET MORE.: NEVER TRUE

## 2024-03-19 ASSESSMENT — PATIENT HEALTH QUESTIONNAIRE - PHQ9
2. FEELING DOWN, DEPRESSED OR HOPELESS: NOT AT ALL
SUM OF ALL RESPONSES TO PHQ QUESTIONS 1-9: 0
SUM OF ALL RESPONSES TO PHQ9 QUESTIONS 1 & 2: 0
SUM OF ALL RESPONSES TO PHQ QUESTIONS 1-9: 0
1. LITTLE INTEREST OR PLEASURE IN DOING THINGS: NOT AT ALL
SUM OF ALL RESPONSES TO PHQ QUESTIONS 1-9: 0
SUM OF ALL RESPONSES TO PHQ QUESTIONS 1-9: 0

## 2024-03-19 ASSESSMENT — LIFESTYLE VARIABLES
HOW OFTEN DO YOU HAVE A DRINK CONTAINING ALCOHOL: NEVER
HOW MANY STANDARD DRINKS CONTAINING ALCOHOL DO YOU HAVE ON A TYPICAL DAY: PATIENT DOES NOT DRINK

## 2024-03-19 ASSESSMENT — ENCOUNTER SYMPTOMS
COUGH: 0
NAUSEA: 0
RESPIRATORY NEGATIVE: 1
VOMITING: 0

## 2024-03-19 NOTE — PROGRESS NOTES
Cardiac monitoring ongoing. Pt medicated per orders for pain with relief noted. VS as charted. Pt awaiting admitting orders at this time. Will continue to monitor.
Chief Complaint   Patient presents with    Follow-up    Shoulder Pain    Medicare AWV     \"Have you been to the ER, urgent care clinic since your last visit?  Hospitalized since your last visit?\"    NO    “Have you seen or consulted any other health care providers outside of [facility] since your last visit?”    NO        “Have you had a colorectal cancer screening such as a colonoscopy/FIT/Cologuard?    NO    No colonoscopy on file  No cologuard on file  Date of last FIT: 7/8/2022   No flexible sigmoidoscopy on file         Click Here for Release of Records Request    
sounds: Normal breath sounds.   Musculoskeletal:      Cervical back: Normal range of motion and neck supple.   Skin:     Capillary Refill: Capillary refill takes less than 2 seconds.   Neurological:      General: No focal deficit present.      Mental Status: She is alert and oriented to person, place, and time.   Psychiatric:         Mood and Affect: Mood normal.         Behavior: Behavior normal.         Thought Content: Thought content normal.         Judgment: Judgment normal.          Assessment/Plan     Diagnosis Orders   1. Chronic low back pain, unspecified back pain laterality, unspecified whether sciatica present  ibuprofen (ADVIL;MOTRIN) 800 MG tablet      2. Chronic obstructive pulmonary disease with (acute) exacerbation (HCC)  fluticasone-umeclidin-vilant (TRELEGY ELLIPTA) 100-62.5-25 MCG/ACT AEPB inhaler      3. Nausea  ondansetron (ZOFRAN-ODT) 4 MG disintegrating tablet      4. Personal history of tobacco use  FL VISIT TO DISCUSS LUNG CA SCREEN W LDCT      5. Medicare annual wellness visit, subsequent          I have discussed the diagnosis with the patient and the intended plan of care as seen in the above orders. The patient has received an after-visit summary and questions were answered concerning future plans. I have discussed medication, side effects, and warnings with the patient in detail. The patient verbalized understanding and is in agreement with the plan of care. The patient will contact the office with any additional concerns.  Follow-up and Dispositions    Return in about 4 months (around 7/19/2024).       lab results and schedule of future lab studies reviewed with patient    DAVID TERESA MD         Medicare Annual Wellness Visit    Ketty Freire is here for Follow-up, Shoulder Pain, and Medicare AWV    Assessment & Plan   Chronic low back pain, unspecified back pain laterality, unspecified whether sciatica present  -     ibuprofen (ADVIL;MOTRIN) 800 MG tablet; Take 1 tablet by mouth

## 2024-07-22 DIAGNOSIS — J44.1 CHRONIC OBSTRUCTIVE PULMONARY DISEASE WITH (ACUTE) EXACERBATION (HCC): ICD-10-CM

## 2024-07-23 ENCOUNTER — OFFICE VISIT (OUTPATIENT)
Facility: CLINIC | Age: 69
End: 2024-07-23
Payer: MEDICARE

## 2024-07-23 VITALS
HEIGHT: 62 IN | WEIGHT: 99.6 LBS | HEART RATE: 84 BPM | DIASTOLIC BLOOD PRESSURE: 60 MMHG | BODY MASS INDEX: 18.33 KG/M2 | OXYGEN SATURATION: 93 % | SYSTOLIC BLOOD PRESSURE: 100 MMHG

## 2024-07-23 DIAGNOSIS — J44.1 CHRONIC OBSTRUCTIVE PULMONARY DISEASE WITH (ACUTE) EXACERBATION (HCC): ICD-10-CM

## 2024-07-23 DIAGNOSIS — G89.29 CHRONIC LOW BACK PAIN, UNSPECIFIED BACK PAIN LATERALITY, UNSPECIFIED WHETHER SCIATICA PRESENT: ICD-10-CM

## 2024-07-23 DIAGNOSIS — M54.50 CHRONIC LOW BACK PAIN, UNSPECIFIED BACK PAIN LATERALITY, UNSPECIFIED WHETHER SCIATICA PRESENT: ICD-10-CM

## 2024-07-23 DIAGNOSIS — L72.3 SEBACEOUS CYST: Primary | ICD-10-CM

## 2024-07-23 DIAGNOSIS — R11.0 NAUSEA: ICD-10-CM

## 2024-07-23 PROCEDURE — 1123F ACP DISCUSS/DSCN MKR DOCD: CPT | Performed by: FAMILY MEDICINE

## 2024-07-23 PROCEDURE — 99214 OFFICE O/P EST MOD 30 MIN: CPT | Performed by: FAMILY MEDICINE

## 2024-07-23 RX ORDER — FLUTICASONE FUROATE, UMECLIDINIUM BROMIDE AND VILANTEROL TRIFENATATE 100; 62.5; 25 UG/1; UG/1; UG/1
1 POWDER RESPIRATORY (INHALATION) DAILY
Qty: 1 EACH | Refills: 2 | Status: SHIPPED | OUTPATIENT
Start: 2024-07-23

## 2024-07-23 RX ORDER — ONDANSETRON 4 MG/1
4 TABLET, ORALLY DISINTEGRATING ORAL EVERY 8 HOURS PRN
Qty: 12 TABLET | Refills: 1 | Status: SHIPPED | OUTPATIENT
Start: 2024-07-23

## 2024-07-23 RX ORDER — IBUPROFEN 800 MG/1
800 TABLET ORAL EVERY 8 HOURS PRN
Qty: 60 TABLET | Refills: 2 | Status: SHIPPED | OUTPATIENT
Start: 2024-07-23

## 2024-07-23 RX ORDER — FLUTICASONE FUROATE, UMECLIDINIUM BROMIDE AND VILANTEROL TRIFENATATE 100; 62.5; 25 UG/1; UG/1; UG/1
POWDER RESPIRATORY (INHALATION)
Qty: 60 EACH | Refills: 2 | OUTPATIENT
Start: 2024-07-23

## 2024-07-23 ASSESSMENT — ENCOUNTER SYMPTOMS
VOMITING: 0
COUGH: 0
NAUSEA: 0
RESPIRATORY NEGATIVE: 1

## 2024-07-23 NOTE — PROGRESS NOTES
Ketty Freire is a 68 y.o. female  presents for   Chief Complaint   Patient presents with    Follow-up     4 month follow up     Skin Problem     Patient states that a pea sized cyst has grown on the back of her neck on the left side. Denies any pain or issues with this.     Medication Refill        No Known Allergies    Current Outpatient Medications:     ibuprofen (ADVIL;MOTRIN) 800 MG tablet, Take 1 tablet by mouth every 8 hours as needed for Pain, Disp: 60 tablet, Rfl: 2    fluticasone-umeclidin-vilant (TRELEGY ELLIPTA) 100-62.5-25 MCG/ACT AEPB inhaler, Inhale 1 puff into the lungs daily, Disp: 1 each, Rfl: 2    ondansetron (ZOFRAN-ODT) 4 MG disintegrating tablet, Take 1 tablet by mouth every 8 hours as needed for Nausea, Disp: 12 tablet, Rfl: 1    albuterol sulfate HFA (PROVENTIL;VENTOLIN;PROAIR) 108 (90 Base) MCG/ACT inhaler, Inhale 2 puffs into the lungs every 4 hours as needed, Disp: , Rfl:     methadone (DOLOPHINE) 10 MG/ML solution, Take 14.5 mLs by mouth daily., Disp: , Rfl:     Budeson-Glycopyrrol-Formoterol (BREZTRI AEROSPHERE) 160-9-4.8 MCG/ACT AERO, Inhale 2 inhalations into the lungs 2 times daily (Patient not taking: Reported on 5/9/2023), Disp: 1 each, Rfl: 5    pantoprazole (PROTONIX) 40 MG tablet, TAKE 1 TABLET BY MOUTH EVERY DAY (Patient not taking: Reported on 5/9/2023), Disp: , Rfl:    Patient Active Problem List   Diagnosis    Other emphysema (HCC)     Past Medical History:   Diagnosis Date    Arthritis     Chronic obstructive pulmonary disease (HCC)     Chronic pain     Fibromyalgia     Ill-defined condition     phillipulds     Social History     Socioeconomic History    Marital status:    Tobacco Use    Smoking status: Every Day     Current packs/day: 0.50     Average packs/day: 0.5 packs/day for 42.0 years (21.0 ttl pk-yrs)     Types: Cigarettes    Smokeless tobacco: Never   Substance and Sexual Activity    Alcohol use: No    Drug use: No     Social Determinants of Health

## 2024-07-23 NOTE — PROGRESS NOTES
Chief Complaint   Patient presents with    Follow-up     4 month follow up     Skin Problem     Patient states that a pea sized cyst has grown on the back of her neck on the left side. Denies any pain or issues with this.     Medication Refill     \"Have you been to the ER, urgent care clinic since your last visit?  Hospitalized since your last visit?\"    NO    “Have you seen or consulted any other health care providers outside of [facility] since your last visit?”    NO    Have you had a mammogram?”   NO    Date of last Mammogram: 6/29/2022         “Have you had a colorectal cancer screening such as a colonoscopy/FIT/Cologuard?    NO    No colonoscopy on file  No cologuard on file  Date of last FIT: 7/8/2022   No flexible sigmoidoscopy on file         Click Here for Release of Records Request

## 2024-07-29 ENCOUNTER — PROCEDURE VISIT (OUTPATIENT)
Facility: CLINIC | Age: 69
End: 2024-07-29
Payer: MEDICARE

## 2024-07-29 VITALS
BODY MASS INDEX: 18.15 KG/M2 | RESPIRATION RATE: 16 BRPM | SYSTOLIC BLOOD PRESSURE: 110 MMHG | OXYGEN SATURATION: 92 % | HEART RATE: 88 BPM | DIASTOLIC BLOOD PRESSURE: 66 MMHG | WEIGHT: 99.25 LBS | TEMPERATURE: 98.5 F

## 2024-07-29 DIAGNOSIS — L72.0 EPIDERMOID CYST: Primary | ICD-10-CM

## 2024-07-29 PROCEDURE — 10160 PNXR ASPIR ABSC HMTMA BULLA: CPT | Performed by: STUDENT IN AN ORGANIZED HEALTH CARE EDUCATION/TRAINING PROGRAM

## 2024-07-29 NOTE — PROGRESS NOTES
Chief Complaint   Patient presents with    Procedure     Cyst removal on the back of the neck       1. \"Have you been to the ER, urgent care clinic since your last visit?  Hospitalized since your last visit?\" No    2. \"Have you seen or consulted any other health care providers outside of the [facility] since your last visit?\" No     3. For patients aged 45-75: Has the patient had a colonoscopy / FIT/ Cologuard? No      If the patient is female:    4. For patients aged 40-74: Has the patient had a mammogram within the past 2 years? No      5. For patients aged 21-65: Has the patient had a pap smear? NA - based on age or sex

## 2024-07-29 NOTE — PROGRESS NOTES
Ketty Freire (: 1955) is a 68 y.o. female, established patient, here for evaluation of the following chief complaint(s):  Procedure (Cyst removal on the back of the neck)        Assessment & Plan  1. Epidermoid cyst  - 40502 - DC PUNCTURE DRAINAGE OF LESION  The symptoms and physical examination findings are consistent with a sebaceous cyst. The cyst appeared suddenly and has been present for about two to three weeks. There is no history of similar issues, drainage, or significant medical conditions such as diabetes or skin cancer. The treatment plan involves draining the cyst under local anesthesia. Lidocaine will be used to numb the area, followed by an incision to drain the cyst and remove its contents. A bandage will be applied post-procedure. The patient has been informed about the possibility of recurrence if the entire cyst wall is not removed. She will need to monitor the area for any signs of recurrence.      Results    1. Epidermoid cyst  -     51018 - DC PUNCTURE DRAINAGE OF LESION    Return if symptoms worsen or fail to improve.         Subjective   History of Present Illness  The patient presents for evaluation of a spot on her hand.    She reports that the spot appeared suddenly one morning about 2 to 3 weeks ago while she was styling her hair in a ponytail. The spot was initially large and has been growing since its appearance. She denies any drainage from the spot and states that this is her first experience with such a condition. She confirms that her hand was normal prior to the appearance of the spot. She has no history of skin issues, skin cancers, or chemotherapy. She also denies having diabetes.      OFFICE PROCEDURE PROGRESS NOTE  Chart reviewed for the following:   José LEVY MD, have reviewed the History, Physical and updated the Allergic reactions for Ketty Freire     TIME OUT performed immediately prior to start of procedure:  José LEVY MD, have performed

## 2024-09-04 ENCOUNTER — TELEMEDICINE (OUTPATIENT)
Facility: CLINIC | Age: 69
End: 2024-09-04

## 2024-09-04 DIAGNOSIS — R05.1 ACUTE COUGH: Primary | ICD-10-CM

## 2024-09-04 RX ORDER — AZITHROMYCIN 250 MG/1
TABLET, FILM COATED ORAL
Qty: 6 TABLET | Refills: 0 | Status: SHIPPED | OUTPATIENT
Start: 2024-09-04 | End: 2024-09-14

## 2024-09-04 RX ORDER — CODEINE PHOSPHATE/GUAIFENESIN 10-100MG/5
5 LIQUID (ML) ORAL 2 TIMES DAILY PRN
Qty: 118 ML | Refills: 0 | Status: SHIPPED | OUTPATIENT
Start: 2024-09-04 | End: 2024-09-18

## 2024-09-04 SDOH — ECONOMIC STABILITY: FOOD INSECURITY: WITHIN THE PAST 12 MONTHS, THE FOOD YOU BOUGHT JUST DIDN'T LAST AND YOU DIDN'T HAVE MONEY TO GET MORE.: NEVER TRUE

## 2024-09-04 SDOH — ECONOMIC STABILITY: FOOD INSECURITY: WITHIN THE PAST 12 MONTHS, YOU WORRIED THAT YOUR FOOD WOULD RUN OUT BEFORE YOU GOT MONEY TO BUY MORE.: NEVER TRUE

## 2024-09-04 SDOH — ECONOMIC STABILITY: INCOME INSECURITY: HOW HARD IS IT FOR YOU TO PAY FOR THE VERY BASICS LIKE FOOD, HOUSING, MEDICAL CARE, AND HEATING?: NOT HARD AT ALL

## 2024-09-04 ASSESSMENT — PATIENT HEALTH QUESTIONNAIRE - PHQ9
SUM OF ALL RESPONSES TO PHQ9 QUESTIONS 1 & 2: 0
2. FEELING DOWN, DEPRESSED OR HOPELESS: NOT AT ALL
SUM OF ALL RESPONSES TO PHQ QUESTIONS 1-9: 0
SUM OF ALL RESPONSES TO PHQ QUESTIONS 1-9: 0
1. LITTLE INTEREST OR PLEASURE IN DOING THINGS: NOT AT ALL
SUM OF ALL RESPONSES TO PHQ QUESTIONS 1-9: 0
SUM OF ALL RESPONSES TO PHQ QUESTIONS 1-9: 0

## 2024-09-04 ASSESSMENT — ENCOUNTER SYMPTOMS
SHORTNESS OF BREATH: 0
CHOKING: 0
CHEST TIGHTNESS: 0
APNEA: 0
WHEEZING: 0
COUGH: 1

## 2024-09-04 NOTE — PROGRESS NOTES
Chief Complaint   Patient presents with    Cough     Patient states that she has bronchitis     \"Have you been to the ER, urgent care clinic since your last visit?  Hospitalized since your last visit?\"    NO    “Have you seen or consulted any other health care providers outside of [facility] since your last visit?”    NO    Have you had a mammogram?”   NO    Date of last Mammogram: 6/29/2022         “Have you had a colorectal cancer screening such as a colonoscopy/FIT/Cologuard?    NO    No colonoscopy on file  No cologuard on file  Date of last FIT: 7/8/2022   No flexible sigmoidoscopy on file         Click Here for Release of Records Request

## 2024-09-04 NOTE — PROGRESS NOTES
Ketty Freire, was evaluated through a synchronous (real-time) audio-video encounter. The patient (or guardian if applicable) is aware that this is a billable service, which includes applicable co-pays. This Virtual Visit was conducted with patient's (and/or legal guardian's) consent. Patient identification was verified, and a caregiver was present when appropriate.   The patient was located at Home: [address] [place] [id number]  Provider was located at Facility (Appt Dept): [address] [place] [address]  [address]  [place] [address]  Confirm you are appropriately licensed, registered, or certified to deliver care in the state where the patient is located as indicated above. If you are not or unsure, please re-schedule the visit: Yes, I confirm.     Ketty Freire (:  1955) is a Established patient, presenting virtually for evaluation of the following:      Below is the assessment and plan developed based on review of pertinent history, physical exam, labs, studies, and medications.     Assessment & Plan  Acute cough   Acute condition, new, meds sent in.    Orders:    guaiFENesin-codeine (TUSSI-ORGANIDIN NR) 100-10 MG/5ML syrup; Take 5 mLs by mouth 2 times daily as needed for Cough for up to 14 days. Max Daily Amount: 10 mLs    azithromycin (ZITHROMAX) 250 MG tablet; 500mg on day 1 followed by 250mg on days 2 - 5      No follow-ups on file.       Subjective   Cough  Pertinent negatives include no shortness of breath or wheezing.     Review of Systems   Respiratory:  Positive for cough. Negative for apnea, choking, chest tightness, shortness of breath and wheezing.           Objective   Patient-Reported Vitals  Patient-Reported Weight: 99lb  Patient-Reported Height: 5'1       Physical Exam  [INSTRUCTIONS:  \"[x]\" Indicates a positive item  \"[]\" Indicates a negative item  -- DELETE ALL ITEMS NOT EXAMINED]    Constitutional: [x] Appears well-developed and well-nourished [x] No apparent

## 2024-10-18 DIAGNOSIS — J44.1 CHRONIC OBSTRUCTIVE PULMONARY DISEASE WITH (ACUTE) EXACERBATION (HCC): ICD-10-CM

## 2024-10-23 RX ORDER — FLUTICASONE FUROATE, UMECLIDINIUM BROMIDE AND VILANTEROL TRIFENATATE 100; 62.5; 25 UG/1; UG/1; UG/1
POWDER RESPIRATORY (INHALATION)
Qty: 60 EACH | Refills: 2 | Status: SHIPPED | OUTPATIENT
Start: 2024-10-23

## 2025-01-09 NOTE — PATIENT INSTRUCTIONS
Last OV 12/4/2024  Next OV Visit date not found      Requested Prescriptions     Pending Prescriptions Disp Refills    metFORMIN (GLUCOPHAGE) 1000 MG tablet [Pharmacy Med Name: METFORMIN HCL 1,000 MG TABLET] 180 tablet 2     Sig: TAKE 1 TABLET BY MOUTH WITH BREAKFAST AND EVENING MEAL      
high cholesterol. If you think you may have a problem with alcohol or drug use, talk to your doctor.   Medicines    Take your medicines exactly as prescribed. Call your doctor if you think you are having a problem with your medicine.     If your doctor recommends aspirin, take the amount directed each day. Make sure you take aspirin and not another kind of pain reliever, such as acetaminophen (Tylenol).   When should you call for help?   Call 911 if you have symptoms of a heart attack. These may include:    Chest pain or pressure, or a strange feeling in the chest.     Sweating.     Shortness of breath.     Pain, pressure, or a strange feeling in the back, neck, jaw, or upper belly or in one or both shoulders or arms.     Lightheadedness or sudden weakness.     A fast or irregular heartbeat.   After you call 911, the  may tell you to chew 1 adult-strength or 2 to 4 low-dose aspirin. Wait for an ambulance. Do not try to drive yourself.  Watch closely for changes in your health, and be sure to contact your doctor if you have any problems.  Where can you learn more?  Go to https://www.[employer].net/patientEd and enter F075 to learn more about \"A Healthy Heart: Care Instructions.\"  Current as of: June 24, 2023               Content Version: 14.0  © 5765-4525 [employer].   Care instructions adapted under license by [employer]. If you have questions about a medical condition or this instruction, always ask your healthcare professional. [employer] disclaims any warranty or liability for your use of this information.      Personalized Preventive Plan for Ketty Freire - 3/19/2024  Medicare offers a range of preventive health benefits. Some of the tests and screenings are paid in full while other may be subject to a deductible, co-insurance, and/or copay.    Some of these benefits include a comprehensive review of your medical history including lifestyle, illnesses that may run in your

## 2025-01-22 ENCOUNTER — TELEPHONE (OUTPATIENT)
Facility: CLINIC | Age: 70
End: 2025-01-22

## 2025-01-22 DIAGNOSIS — R05.1 ACUTE COUGH: Primary | ICD-10-CM

## 2025-01-22 NOTE — TELEPHONE ENCOUNTER
Pt called to request a message be sent to  in regards to an ongoing cough that has been going on since last year. Pt states she has seen  for this before and has still been experiencing this since. Pt would like to know if  could send over a cough syrup for her. Please review and advise as needed.

## 2025-01-23 RX ORDER — CODEINE PHOSPHATE AND GUAIFENESIN 10; 100 MG/5ML; MG/5ML
5 SOLUTION ORAL 3 TIMES DAILY PRN
Qty: 118 ML | Refills: 0 | Status: SHIPPED | OUTPATIENT
Start: 2025-01-23 | End: 2025-02-06

## 2025-01-28 ENCOUNTER — TELEPHONE (OUTPATIENT)
Facility: CLINIC | Age: 70
End: 2025-01-28

## 2025-01-28 NOTE — TELEPHONE ENCOUNTER
Patient is calling in about the:  guaiFENesin-codeine (GUAIFENESIN AC) 100-10 MG/5ML liquid    [facility] are [facility].    Patient requests for it to be sent to:  [facility], [place] - [address] [address] - P [phone] - F [phone]    Please advise. Patient still has a cough and its getting worse.

## 2025-03-14 ENCOUNTER — TELEPHONE (OUTPATIENT)
Facility: CLINIC | Age: 70
End: 2025-03-14

## 2025-03-14 DIAGNOSIS — R11.0 NAUSEA: ICD-10-CM

## 2025-03-14 DIAGNOSIS — G89.29 CHRONIC LOW BACK PAIN, UNSPECIFIED BACK PAIN LATERALITY, UNSPECIFIED WHETHER SCIATICA PRESENT: ICD-10-CM

## 2025-03-14 DIAGNOSIS — M54.50 CHRONIC LOW BACK PAIN, UNSPECIFIED BACK PAIN LATERALITY, UNSPECIFIED WHETHER SCIATICA PRESENT: ICD-10-CM

## 2025-03-14 RX ORDER — ONDANSETRON 4 MG/1
4 TABLET, ORALLY DISINTEGRATING ORAL EVERY 8 HOURS PRN
Qty: 12 TABLET | Refills: 2 | Status: SHIPPED | OUTPATIENT
Start: 2025-03-14

## 2025-03-14 RX ORDER — IBUPROFEN 800 MG/1
800 TABLET, FILM COATED ORAL EVERY 8 HOURS PRN
Qty: 60 TABLET | Refills: 2 | Status: SHIPPED | OUTPATIENT
Start: 2025-03-14

## 2025-03-14 NOTE — TELEPHONE ENCOUNTER
This patient contacted office for the following prescriptions to be filled:    Medication requested : ondansetron (ZOFRAN-ODT) 4 MG disintegrating tablet     ibuprofen (ADVIL;MOTRIN) 800 MG tablet     PCP:   Pharmacy or Print:  Pharmacy   Mail order or Local pharmacy  [facility]/pharmacy #[id number] - [place], [place] - [address]       Scheduled appointment if not seen by current providers in office: LOV: 24 [id number]

## 2025-03-30 DIAGNOSIS — J44.1 CHRONIC OBSTRUCTIVE PULMONARY DISEASE WITH (ACUTE) EXACERBATION (HCC): ICD-10-CM

## 2025-03-31 RX ORDER — FLUTICASONE FUROATE, UMECLIDINIUM BROMIDE AND VILANTEROL TRIFENATATE 100; 62.5; 25 UG/1; UG/1; UG/1
POWDER RESPIRATORY (INHALATION)
Qty: 60 EACH | Refills: 3 | Status: SHIPPED | OUTPATIENT
Start: 2025-03-31

## 2025-03-31 NOTE — TELEPHONE ENCOUNTER
Last OV: 09/04/2024  Next OV: 04/30/2025  Last refill: 03/04/2025      Refill request routed to Dr. Nolen for review.

## 2025-06-13 ENCOUNTER — COMMUNITY OUTREACH (OUTPATIENT)
Facility: CLINIC | Age: 70
End: 2025-06-13

## 2025-06-23 NOTE — PROGRESS NOTES
Have you been to the ER, urgent care clinic since your last visit?  Hospitalized since your last visit?   YES - When: approximately 1 months ago.  Where and Why: [facility].    Have you seen or consulted any other health care providers outside our system since your last visit?   NO    Have you had a mammogram?”   NO    Date of last Mammogram: 6/29/2022       “Have you had a colorectal cancer screening such as a colonoscopy/FIT/Cologuard?    NO    No colonoscopy on file  No cologuard on file  Date of last FIT: 7/8/2022   No flexible sigmoidoscopy on file

## 2025-06-24 ENCOUNTER — OFFICE VISIT (OUTPATIENT)
Facility: CLINIC | Age: 70
End: 2025-06-24
Payer: MEDICARE

## 2025-06-24 VITALS
WEIGHT: 101.6 LBS | SYSTOLIC BLOOD PRESSURE: 102 MMHG | HEIGHT: 62 IN | BODY MASS INDEX: 18.69 KG/M2 | OXYGEN SATURATION: 89 % | TEMPERATURE: 97.2 F | DIASTOLIC BLOOD PRESSURE: 60 MMHG | HEART RATE: 82 BPM

## 2025-06-24 DIAGNOSIS — Z00.00 MEDICARE ANNUAL WELLNESS VISIT, SUBSEQUENT: ICD-10-CM

## 2025-06-24 DIAGNOSIS — J44.1 CHRONIC OBSTRUCTIVE PULMONARY DISEASE WITH (ACUTE) EXACERBATION (HCC): ICD-10-CM

## 2025-06-24 DIAGNOSIS — R05.1 ACUTE COUGH: Primary | ICD-10-CM

## 2025-06-24 DIAGNOSIS — R11.0 NAUSEA: ICD-10-CM

## 2025-06-24 DIAGNOSIS — Z87.891 PERSONAL HISTORY OF TOBACCO USE: ICD-10-CM

## 2025-06-24 PROCEDURE — G0296 VISIT TO DETERM LDCT ELIG: HCPCS | Performed by: FAMILY MEDICINE

## 2025-06-24 PROCEDURE — G0439 PPPS, SUBSEQ VISIT: HCPCS | Performed by: FAMILY MEDICINE

## 2025-06-24 PROCEDURE — 1123F ACP DISCUSS/DSCN MKR DOCD: CPT | Performed by: FAMILY MEDICINE

## 2025-06-24 PROCEDURE — 1160F RVW MEDS BY RX/DR IN RCRD: CPT | Performed by: FAMILY MEDICINE

## 2025-06-24 PROCEDURE — 1159F MED LIST DOCD IN RCRD: CPT | Performed by: FAMILY MEDICINE

## 2025-06-24 PROCEDURE — 99214 OFFICE O/P EST MOD 30 MIN: CPT | Performed by: FAMILY MEDICINE

## 2025-06-24 RX ORDER — AZITHROMYCIN 250 MG/1
TABLET, FILM COATED ORAL
Qty: 6 TABLET | Refills: 0 | Status: SHIPPED | OUTPATIENT
Start: 2025-06-24 | End: 2025-07-04

## 2025-06-24 RX ORDER — ONDANSETRON 4 MG/1
4 TABLET, ORALLY DISINTEGRATING ORAL EVERY 8 HOURS PRN
Qty: 12 TABLET | Refills: 2 | Status: SHIPPED | OUTPATIENT
Start: 2025-06-24

## 2025-06-24 SDOH — ECONOMIC STABILITY: FOOD INSECURITY: WITHIN THE PAST 12 MONTHS, THE FOOD YOU BOUGHT JUST DIDN'T LAST AND YOU DIDN'T HAVE MONEY TO GET MORE.: NEVER TRUE

## 2025-06-24 SDOH — ECONOMIC STABILITY: FOOD INSECURITY: WITHIN THE PAST 12 MONTHS, YOU WORRIED THAT YOUR FOOD WOULD RUN OUT BEFORE YOU GOT MONEY TO BUY MORE.: NEVER TRUE

## 2025-06-24 ASSESSMENT — PATIENT HEALTH QUESTIONNAIRE - PHQ9
9. THOUGHTS THAT YOU WOULD BE BETTER OFF DEAD, OR OF HURTING YOURSELF: NOT AT ALL
6. FEELING BAD ABOUT YOURSELF - OR THAT YOU ARE A FAILURE OR HAVE LET YOURSELF OR YOUR FAMILY DOWN: NOT AT ALL
10. IF YOU CHECKED OFF ANY PROBLEMS, HOW DIFFICULT HAVE THESE PROBLEMS MADE IT FOR YOU TO DO YOUR WORK, TAKE CARE OF THINGS AT HOME, OR GET ALONG WITH OTHER PEOPLE: NOT DIFFICULT AT ALL
5. POOR APPETITE OR OVEREATING: MORE THAN HALF THE DAYS
4. FEELING TIRED OR HAVING LITTLE ENERGY: MORE THAN HALF THE DAYS
8. MOVING OR SPEAKING SO SLOWLY THAT OTHER PEOPLE COULD HAVE NOTICED. OR THE OPPOSITE, BEING SO FIGETY OR RESTLESS THAT YOU HAVE BEEN MOVING AROUND A LOT MORE THAN USUAL: NOT AT ALL
SUM OF ALL RESPONSES TO PHQ QUESTIONS 1-9: 7
1. LITTLE INTEREST OR PLEASURE IN DOING THINGS: NOT AT ALL
SUM OF ALL RESPONSES TO PHQ QUESTIONS 1-9: 7
SUM OF ALL RESPONSES TO PHQ QUESTIONS 1-9: 7
2. FEELING DOWN, DEPRESSED OR HOPELESS: NOT AT ALL
3. TROUBLE FALLING OR STAYING ASLEEP: NEARLY EVERY DAY
7. TROUBLE CONCENTRATING ON THINGS, SUCH AS READING THE NEWSPAPER OR WATCHING TELEVISION: NOT AT ALL
SUM OF ALL RESPONSES TO PHQ QUESTIONS 1-9: 7

## 2025-06-24 ASSESSMENT — LIFESTYLE VARIABLES
HOW MANY STANDARD DRINKS CONTAINING ALCOHOL DO YOU HAVE ON A TYPICAL DAY: PATIENT DOES NOT DRINK
HOW OFTEN DO YOU HAVE A DRINK CONTAINING ALCOHOL: NEVER

## 2025-06-24 ASSESSMENT — ENCOUNTER SYMPTOMS
NAUSEA: 0
RESPIRATORY NEGATIVE: 1
VOMITING: 0
COUGH: 0

## 2025-06-24 NOTE — PROGRESS NOTES
Ketty Freire is a 69 y.o. female  presents for   Chief Complaint   Patient presents with    Medicare AWV        No Known Allergies    Current Outpatient Medications:     TRELEGY ELLIPTA 100-62.5-25 MCG/ACT AEPB inhaler, TAKE 1 PUFF BY MOUTH EVERY DAY, Disp: 60 each, Rfl: 3    ondansetron (ZOFRAN-ODT) 4 MG disintegrating tablet, Take 1 tablet by mouth every 8 hours as needed for Nausea, Disp: 12 tablet, Rfl: 2    ibuprofen (ADVIL;MOTRIN) 800 MG tablet, Take 1 tablet by mouth every 8 hours as needed for Pain, Disp: 60 tablet, Rfl: 2    albuterol sulfate HFA (PROVENTIL;VENTOLIN;PROAIR) 108 (90 Base) MCG/ACT inhaler, Inhale 2 puffs into the lungs every 4 hours as needed, Disp: , Rfl:     methadone (DOLOPHINE) 10 MG/ML solution, Take 14.5 mLs by mouth daily., Disp: , Rfl:     Budeson-Glycopyrrol-Formoterol (BREZTRI AEROSPHERE) 160-9-4.8 MCG/ACT AERO, Inhale 2 inhalations into the lungs 2 times daily (Patient not taking: Reported on 6/24/2025), Disp: 1 each, Rfl: 5    pantoprazole (PROTONIX) 40 MG tablet, TAKE 1 TABLET BY MOUTH EVERY DAY (Patient not taking: Reported on 6/24/2025), Disp: , Rfl:    Patient Active Problem List   Diagnosis    Other emphysema (HCC)     Past Medical History:   Diagnosis Date    Arthritis     Chronic obstructive pulmonary disease (HCC)     Chronic pain     Fibromyalgia     Ill-defined condition     raynaulds     Social History     Socioeconomic History    Marital status:      Spouse name: None    Number of children: None    Years of education: None    Highest education level: None   Tobacco Use    Smoking status: Every Day     Current packs/day: 0.50     Average packs/day: 0.5 packs/day for 42.0 years (21.0 ttl pk-yrs)     Types: Cigarettes    Smokeless tobacco: Never   Substance and Sexual Activity    Alcohol use: No    Drug use: No     Social Drivers of Health     Financial Resource Strain: Low Risk  (9/4/2024)    Overall Financial Resource Strain (CARDIA)     Difficulty of Paying

## 2025-07-25 ENCOUNTER — HOSPITAL ENCOUNTER (OUTPATIENT)
Facility: HOSPITAL | Age: 70
Discharge: HOME | End: 2025-07-28
Payer: COMMERCIAL

## 2025-07-25 DIAGNOSIS — M25.511 RIGHT SHOULDER PAIN, UNSPECIFIED CHRONICITY: ICD-10-CM

## 2025-07-25 DIAGNOSIS — Z01.812 PRE-OPERATIVE LABORATORY EXAMINATION: ICD-10-CM

## 2025-07-25 LAB
ALBUMIN SERPL-MCNC: 3.9 G/DL (ref 3.4–5)
ALBUMIN/GLOB SERPL: 1.2 (ref 0.8–1.7)
ALP SERPL-CCNC: 86 U/L (ref 45–117)
ALT SERPL-CCNC: 19 U/L (ref 10–35)
ANION GAP SERPL CALC-SCNC: 13 MMOL/L (ref 3–18)
AST SERPL-CCNC: 25 U/L (ref 10–38)
BASOPHILS # BLD: 0.07 K/UL (ref 0–0.1)
BASOPHILS NFR BLD: 0.7 % (ref 0–2)
BILIRUB SERPL-MCNC: 0.4 MG/DL (ref 0.2–1)
BUN SERPL-MCNC: 18 MG/DL (ref 6–23)
BUN/CREAT SERPL: 21 (ref 12–20)
CALCIUM SERPL-MCNC: 10.5 MG/DL (ref 8.5–10.1)
CHLORIDE SERPL-SCNC: 104 MMOL/L (ref 98–107)
CO2 SERPL-SCNC: 32 MMOL/L (ref 21–32)
CREAT SERPL-MCNC: 0.83 MG/DL (ref 0.6–1.3)
DIFFERENTIAL METHOD BLD: ABNORMAL
EKG ATRIAL RATE: 74 BPM
EKG DIAGNOSIS: NORMAL
EKG P AXIS: 82 DEGREES
EKG P-R INTERVAL: 154 MS
EKG Q-T INTERVAL: 388 MS
EKG QRS DURATION: 80 MS
EKG QTC CALCULATION (BAZETT): 430 MS
EKG R AXIS: -57 DEGREES
EKG T AXIS: 73 DEGREES
EKG VENTRICULAR RATE: 74 BPM
EOSINOPHIL # BLD: 0.34 K/UL (ref 0–0.4)
EOSINOPHIL NFR BLD: 3.2 % (ref 0–5)
ERYTHROCYTE [DISTWIDTH] IN BLOOD BY AUTOMATED COUNT: 14.5 % (ref 11.6–14.5)
GLOBULIN SER CALC-MCNC: 3.4 G/DL (ref 2–4)
GLUCOSE SERPL-MCNC: 94 MG/DL (ref 74–108)
HCT VFR BLD AUTO: 47.1 % (ref 35–45)
HGB BLD-MCNC: 15.1 G/DL (ref 12–16)
IMM GRANULOCYTES # BLD AUTO: 0.02 K/UL (ref 0–0.04)
IMM GRANULOCYTES NFR BLD AUTO: 0.2 % (ref 0–0.5)
LYMPHOCYTES # BLD: 1.71 K/UL (ref 0.9–3.3)
LYMPHOCYTES NFR BLD: 16.3 % (ref 21–52)
MCH RBC QN AUTO: 29.9 PG (ref 24–34)
MCHC RBC AUTO-ENTMCNC: 32.1 G/DL (ref 31–37)
MCV RBC AUTO: 93.3 FL (ref 78–100)
MONOCYTES # BLD: 0.62 K/UL (ref 0.05–1.2)
MONOCYTES NFR BLD: 5.9 % (ref 3–10)
NEUTS SEG # BLD: 7.74 K/UL (ref 1.8–8)
NEUTS SEG NFR BLD: 73.7 % (ref 40–73)
NRBC # BLD: 0 K/UL (ref 0–0.01)
NRBC BLD-RTO: 0 PER 100 WBC
PLATELET # BLD AUTO: 297 K/UL (ref 135–420)
PMV BLD AUTO: 8.9 FL (ref 9.2–11.8)
POTASSIUM SERPL-SCNC: 5.6 MMOL/L (ref 3.5–5.5)
PROT SERPL-MCNC: 7.3 G/DL (ref 6.4–8.2)
RBC # BLD AUTO: 5.05 M/UL (ref 4.2–5.3)
SODIUM SERPL-SCNC: 149 MMOL/L (ref 136–145)
WBC # BLD AUTO: 10.5 K/UL (ref 4.6–13.2)

## 2025-07-25 PROCEDURE — 36415 COLL VENOUS BLD VENIPUNCTURE: CPT

## 2025-07-25 PROCEDURE — 85025 COMPLETE CBC W/AUTO DIFF WBC: CPT

## 2025-07-25 PROCEDURE — 80053 COMPREHEN METABOLIC PANEL: CPT

## 2025-07-25 PROCEDURE — 93005 ELECTROCARDIOGRAM TRACING: CPT

## 2025-07-28 LAB
EKG ATRIAL RATE: 74 BPM
EKG DIAGNOSIS: NORMAL
EKG P AXIS: 82 DEGREES
EKG P-R INTERVAL: 154 MS
EKG Q-T INTERVAL: 388 MS
EKG QRS DURATION: 80 MS
EKG QTC CALCULATION (BAZETT): 430 MS
EKG R AXIS: -57 DEGREES
EKG T AXIS: 73 DEGREES
EKG VENTRICULAR RATE: 74 BPM
FAX TO NUMBER: NORMAL
TEST RESULTS FAXED TO: NORMAL

## 2025-07-28 PROCEDURE — 93010 ELECTROCARDIOGRAM REPORT: CPT | Performed by: INTERNAL MEDICINE

## 2025-08-05 ENCOUNTER — CLINICAL DOCUMENTATION (OUTPATIENT)
Facility: CLINIC | Age: 70
End: 2025-08-05

## 2025-08-11 DIAGNOSIS — J44.1 CHRONIC OBSTRUCTIVE PULMONARY DISEASE WITH (ACUTE) EXACERBATION (HCC): ICD-10-CM

## 2025-08-12 RX ORDER — FLUTICASONE FUROATE, UMECLIDINIUM BROMIDE AND VILANTEROL TRIFENATATE 100; 62.5; 25 UG/1; UG/1; UG/1
POWDER RESPIRATORY (INHALATION)
Qty: 60 EACH | Refills: 4 | Status: SHIPPED | OUTPATIENT
Start: 2025-08-12